# Patient Record
Sex: MALE | Race: WHITE | URBAN - METROPOLITAN AREA
[De-identification: names, ages, dates, MRNs, and addresses within clinical notes are randomized per-mention and may not be internally consistent; named-entity substitution may affect disease eponyms.]

---

## 2018-03-28 ENCOUNTER — EMERGENCY (EMERGENCY)
Facility: HOSPITAL | Age: 43
LOS: 1 days | Discharge: HOME | End: 2018-03-28
Admitting: EMERGENCY MEDICINE
Payer: COMMERCIAL

## 2018-03-28 VITALS
DIASTOLIC BLOOD PRESSURE: 70 MMHG | TEMPERATURE: 98 F | OXYGEN SATURATION: 97 % | SYSTOLIC BLOOD PRESSURE: 128 MMHG | RESPIRATION RATE: 20 BRPM | HEART RATE: 110 BPM

## 2018-03-28 DIAGNOSIS — R41.82 ALTERED MENTAL STATUS, UNSPECIFIED: ICD-10-CM

## 2018-03-28 DIAGNOSIS — F10.129 ALCOHOL ABUSE WITH INTOXICATION, UNSPECIFIED: ICD-10-CM

## 2018-03-28 PROCEDURE — 99284 EMERGENCY DEPT VISIT MOD MDM: CPT

## 2018-03-28 NOTE — ED PROVIDER NOTE - OBJECTIVE STATEMENT
41 y/o M with Hx of Seizure BIBA for alcohol intoxication 41 y/o M with Hx of Seizure BIBA for alcohol intoxication. Pt is arousable to tactile stimulus but cannot answer questions appropriately at this time. Remainder of Hx limited 2/2 ETOH intox.

## 2018-03-28 NOTE — ED PROVIDER NOTE - MEDICAL DECISION MAKING DETAILS
Pt A&Ox3. NAD. Calm and cooperative. Clear speech, steady gait with no complaints at this time. Requesting his dose of Keppra for Seizure D/O stating that he missed his dose today. Otherwise no sx's or complaints at this time. Will give dose of Keppra and D/C.

## 2018-03-28 NOTE — ED ADULT TRIAGE NOTE - CHIEF COMPLAINT QUOTE
BLAS, ambulatory, for altered mental status. Pt admits to drinking tonight. Also states hx of seizures, requesting a dose of his medication; keppra.

## 2018-03-29 RX ORDER — LEVETIRACETAM 250 MG/1
500 TABLET, FILM COATED ORAL ONCE
Refills: 0 | Status: COMPLETED | OUTPATIENT
Start: 2018-03-29 | End: 2018-03-29

## 2018-03-29 RX ADMIN — LEVETIRACETAM 500 MILLIGRAM(S): 250 TABLET, FILM COATED ORAL at 01:42

## 2018-05-16 ENCOUNTER — EMERGENCY (EMERGENCY)
Facility: HOSPITAL | Age: 43
LOS: 1 days | Discharge: HOME | End: 2018-05-16
Admitting: EMERGENCY MEDICINE
Payer: MEDICAID

## 2018-05-16 VITALS
HEART RATE: 62 BPM | TEMPERATURE: 96 F | DIASTOLIC BLOOD PRESSURE: 71 MMHG | RESPIRATION RATE: 16 BRPM | SYSTOLIC BLOOD PRESSURE: 111 MMHG | OXYGEN SATURATION: 98 %

## 2018-05-16 DIAGNOSIS — Z79.899 OTHER LONG TERM (CURRENT) DRUG THERAPY: ICD-10-CM

## 2018-05-16 DIAGNOSIS — F10.120 ALCOHOL ABUSE WITH INTOXICATION, UNCOMPLICATED: ICD-10-CM

## 2018-05-16 DIAGNOSIS — F10.129 ALCOHOL ABUSE WITH INTOXICATION, UNSPECIFIED: ICD-10-CM

## 2018-05-16 PROCEDURE — 99284 EMERGENCY DEPT VISIT MOD MDM: CPT

## 2018-05-16 NOTE — ED PROVIDER NOTE - PROGRESS NOTE DETAILS
patient ambulated to bathroom. talking on cell phone. requesting to leave The patient is now awake and alert, clinically sober.  Able to walk a straight line.  Repeat exam and neuro/cranial nerve exams normal.  No evidence of head/neck trauma.  Patient denies any pain or other complaints.  Denies cp/sob/ha/abd pain.  Abd soft, lungs clear, heart exam normal.  Dionisio po challenge.  Patient says only used alcohol no other substances.  Denies any assault.  Feels much better and pt feels safe for discharge.  No evidence of intoxication at this time or alcohol withdrawal.  No other complaints on discharge.

## 2018-05-16 NOTE — ED PROVIDER NOTE - OBJECTIVE STATEMENT
42 year old male brought in for alcohol intoxication. reports EMS found him sleeping on the ground.   Admits to heavy ETOH use today, no other complaints.  Placed in stretcher.  Unable to cooperate with remainder of history. translation provided by NEHEMIAH Chawla. denies any head trauma. patient reports did not fall. lives at [facility]

## 2018-05-17 ENCOUNTER — EMERGENCY (EMERGENCY)
Facility: HOSPITAL | Age: 43
LOS: 1 days | Discharge: HOME | End: 2018-05-17
Admitting: EMERGENCY MEDICINE
Payer: MEDICAID

## 2018-05-17 VITALS
SYSTOLIC BLOOD PRESSURE: 132 MMHG | RESPIRATION RATE: 16 BRPM | HEART RATE: 88 BPM | DIASTOLIC BLOOD PRESSURE: 78 MMHG | OXYGEN SATURATION: 97 %

## 2018-05-17 VITALS
OXYGEN SATURATION: 97 % | DIASTOLIC BLOOD PRESSURE: 22 MMHG | RESPIRATION RATE: 18 BRPM | TEMPERATURE: 98 F | SYSTOLIC BLOOD PRESSURE: 121 MMHG | HEART RATE: 96 BPM

## 2018-05-17 DIAGNOSIS — Z79.899 OTHER LONG TERM (CURRENT) DRUG THERAPY: ICD-10-CM

## 2018-05-17 DIAGNOSIS — F10.120 ALCOHOL ABUSE WITH INTOXICATION, UNCOMPLICATED: ICD-10-CM

## 2018-05-17 DIAGNOSIS — R41.82 ALTERED MENTAL STATUS, UNSPECIFIED: ICD-10-CM

## 2018-05-17 PROCEDURE — 99284 EMERGENCY DEPT VISIT MOD MDM: CPT | Mod: 25

## 2018-05-17 NOTE — ED PROVIDER NOTE - PROGRESS NOTE DETAILS
sleeping but arousable in stretcher The patient is now awake and alert, clinically sober.  Able to walk a straight line.  Repeat exam and neuro/cranial nerve exams normal.  No evidence of head/neck trauma.  Patient denies any pain or other complaints.  Denies cp/sob/ha/abd pain.  Abd soft, lungs clear, heart exam normal.  Dionisio po challenge.  Patient says only used alcohol no other substances.  Denies any assault.  Feels much better and pt feels safe for discharge.  No evidence of intoxication at this time or alcohol withdrawal.  No other complaints on discharge.

## 2018-05-17 NOTE — ED PROVIDER NOTE - OBJECTIVE STATEMENT
42 year old male brought in by EMS for alcohol intox. picked up at [address] patient was discharged from ED a few hours earlier. admits to drinking etoh. no sign of trauma. Placed in stretcher and quickly falls asleep.  Unable to cooperate with remainder of history.

## 2018-06-13 ENCOUNTER — EMERGENCY (EMERGENCY)
Facility: HOSPITAL | Age: 43
LOS: 1 days | Discharge: HOME | End: 2018-06-13
Attending: EMERGENCY MEDICINE | Admitting: EMERGENCY MEDICINE
Payer: COMMERCIAL

## 2018-06-13 VITALS
RESPIRATION RATE: 16 BRPM | SYSTOLIC BLOOD PRESSURE: 110 MMHG | OXYGEN SATURATION: 97 % | DIASTOLIC BLOOD PRESSURE: 66 MMHG | TEMPERATURE: 98 F | HEART RATE: 86 BPM

## 2018-06-13 VITALS
TEMPERATURE: 97 F | DIASTOLIC BLOOD PRESSURE: 62 MMHG | OXYGEN SATURATION: 97 % | HEART RATE: 73 BPM | SYSTOLIC BLOOD PRESSURE: 98 MMHG | RESPIRATION RATE: 18 BRPM
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
## 2018-06-13 DIAGNOSIS — F10.120 ALCOHOL ABUSE WITH INTOXICATION, UNCOMPLICATED: ICD-10-CM
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
## 2018-06-13 DIAGNOSIS — S00.81XA ABRASION OF OTHER PART OF HEAD, INITIAL ENCOUNTER: ICD-10-CM
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
## 2018-06-13 DIAGNOSIS — R41.82 ALTERED MENTAL STATUS, UNSPECIFIED: ICD-10-CM

## 2018-06-13 DIAGNOSIS — S09.90XA UNSPECIFIED INJURY OF HEAD, INITIAL ENCOUNTER: ICD-10-CM

## 2018-06-13 PROCEDURE — 99284 EMERGENCY DEPT VISIT MOD MDM: CPT

## 2018-06-13 NOTE — ED PROVIDER NOTE - MEDICAL DECISION MAKING DETAILS
Patient here with apparent isolated EtOH intoxication. Will observe until more awake, alert, steady and with a safe plan for d/c.

## 2018-06-13 NOTE — ED PROVIDER NOTE - PMH
Alcohol abuse Alcohol abuse    Nonintractable epilepsy without status epilepticus, unspecified epilepsy type

## 2018-06-13 NOTE — ED ADULT TRIAGE NOTE - CHIEF COMPLAINT QUOTE
Pt. picked up from [employer] [facility] for sleeping on the street, abrasion to forehead and hand. Pt. has alcohol on his person when picked up.

## 2018-06-13 NOTE — ED PROVIDER NOTE - OBJECTIVE STATEMENT
Patient was BIBE for public EtOH intoxication. Was found passed out on the street. Unsteady gait, drowsy. No pain, no n/v and + abrasion to head.  No other obvious trauma or incontinence.

## 2018-06-13 NOTE — ED PROVIDER NOTE - CARE PLAN
Principal Discharge DX:	Alcoholic intoxication without complication  Secondary Diagnosis:	Head injuries, initial encounter

## 2018-06-13 NOTE — ED PROVIDER NOTE - PHYSICAL EXAMINATION
Const: Severe EtOH intox, poor hygiene, unkempt  ENT: Airway patent, protecting airway. Nasal mucosa clear. MMM. No scalp hematoma and no apparent c-spine tenderness. + forehead abrasion r.  Eyes: Clear bilaterally, pupils equal, round 2mm  Cardiac: Normal rate, regular rhythm.  Heart sounds S1, S2.  No murmurs, rubs or gallops.  Resp: Breath sounds clear and equal bilaterally.  GI: Abdomen soft, appears non-tender, no guarding.  MSK: No signs of acute trauma or injury.   Neuro: Severe EtOH intox, QUILES, normal tone, non-verbal  Skin: No signs of acute trauma or injury.   Psych, Severe EtOH intox

## 2018-06-13 NOTE — ED PROVIDER NOTE - PROGRESS NOTE DETAILS
HISTORY:

SOB  



COMPARISON:

No prior. 



FINDINGS:



LUNGS:

Poor inspiration with low lung volumes, crowded bronchovascular 

markings and mild bibasilar atelectasis.



PLEURA:

No significant pleural effusion identified, no pneumothorax apparent.



CARDIOVASCULAR:

Heart size appears upper limits of normal in size. 



OSSEOUS STRUCTURES:

No significant abnormalities.



VISUALIZED UPPER ABDOMEN:

Normal.



OTHER FINDINGS:

None.



IMPRESSION:

Poor inspiration with low lung volumes, crowded bronchovascular 

markings and mild bibasilar atelectasis. Patient awake, wants to go. Refused CT.

## 2018-06-13 NOTE — ED ADULT NURSE NOTE - CHIEF COMPLAINT QUOTE
Pt. picked up from [facility] for sleeping on the street, abrasion to forehead and hand. Pt. has alcohol on his person when picked up.

## 2018-06-25 ENCOUNTER — EMERGENCY (EMERGENCY)
Facility: HOSPITAL | Age: 43
LOS: 1 days | Discharge: HOME | End: 2018-06-25
Attending: EMERGENCY MEDICINE | Admitting: EMERGENCY MEDICINE
Payer: COMMERCIAL

## 2018-06-25 VITALS
TEMPERATURE: 98 F | HEART RATE: 89 BPM | RESPIRATION RATE: 16 BRPM | OXYGEN SATURATION: 99 % | SYSTOLIC BLOOD PRESSURE: 112 MMHG | DIASTOLIC BLOOD PRESSURE: 71 MMHG

## 2018-06-25 DIAGNOSIS — R41.82 ALTERED MENTAL STATUS, UNSPECIFIED: ICD-10-CM

## 2018-06-25 DIAGNOSIS — F10.129 ALCOHOL ABUSE WITH INTOXICATION, UNSPECIFIED: ICD-10-CM

## 2018-06-25 DIAGNOSIS — Z79.899 OTHER LONG TERM (CURRENT) DRUG THERAPY: ICD-10-CM

## 2018-06-25 PROCEDURE — 99284 EMERGENCY DEPT VISIT MOD MDM: CPT

## 2018-06-25 NOTE — ED PROVIDER NOTE - OBJECTIVE STATEMENT
43 y/o M w/ EtOH abuse and seizures, BIBEMS for public EtOH intoxication. EMS states he was drinking Gatorade. Admits to ETOH use today, no other complaints.  Placed in stretcher and quickly falls asleep. No other obvious trauma or incontinence. 43 y/o M w/ hx EtOH abuse and seizures, BIBEMS for public EtOH intoxication. EMS states he was drinking Gatorade. Admits to ETOH use today, no other complaints.  Placed in stretcher and quickly falls asleep. No other obvious trauma or incontinence.

## 2018-06-25 NOTE — ED PROVIDER NOTE - PMH
Alcohol abuse    Alcohol abuse    Medical history unknown    Nonintractable epilepsy without status epilepticus, unspecified epilepsy type    Seizure

## 2018-06-25 NOTE — SBIRT NOTE. - NSSBIRTSERVICES_GEN_A_ED_FT
Provided SBIRT services: Full screen positive. Referral to Treatment attempted. Screening results were reviewed with the patient and patient was provided information about healthy guidelines and potential negative consequences associated with level of risk. Motivation and readiness to reduce or stop use was discussed and goals and activities to make changes were suggested/offered.  Options discussed for further evaluation and treatment, but referral to treatment was not completed because Patient refused    Audit Score: 20  DAST Score:0  Duration = 20 Minutes

## 2018-07-21 ENCOUNTER — INPATIENT (INPATIENT)
Facility: HOSPITAL | Age: 43
LOS: 6 days | Discharge: HOME | DRG: 896 | End: 2018-07-28
Attending: INTERNAL MEDICINE | Admitting: INTERNAL MEDICINE
Payer: COMMERCIAL

## 2018-07-21 VITALS
DIASTOLIC BLOOD PRESSURE: 84 MMHG | SYSTOLIC BLOOD PRESSURE: 127 MMHG | HEART RATE: 59 BPM | TEMPERATURE: 97 F | OXYGEN SATURATION: 99 % | RESPIRATION RATE: 18 BRPM

## 2018-07-21 PROCEDURE — 99285 EMERGENCY DEPT VISIT HI MDM: CPT | Mod: 25

## 2018-07-21 PROCEDURE — 70450 CT HEAD/BRAIN W/O DYE: CPT | Mod: 26

## 2018-07-21 PROCEDURE — 93010 ELECTROCARDIOGRAM REPORT: CPT

## 2018-07-21 NOTE — ED PROVIDER NOTE - PROGRESS NOTE DETAILS
Pt awake but appears to have gone into etoh withdrawal.  +Tremors and tongue fasciculations.  Will place on monitor.  No tachycardia and no HTN.  Will give benzos and place on observation.  Will check labs and given thiamine and folate.  Will start IV hydration.

## 2018-07-21 NOTE — ED ADULT NURSE NOTE - OBJECTIVE STATEMENT
41 yo M BIBA for AMS. Pt admits to ETOH use today. Pt has bump on right forehead with abrasion noted. pt states he has h/o epilepsy and is in need of keppra/

## 2018-07-21 NOTE — ED PROVIDER NOTE - OBJECTIVE STATEMENT
Pt is a 43yo M Pt BIB by EMS for ETOH intoxication.  Admits to heavy ETOH use today, no other complaints.  Placed in stretcher and quickly falls asleep.  Unable to cooperate with remainder of history.

## 2018-07-21 NOTE — ED PROVIDER NOTE - PHYSICAL EXAMINATION
General: lethargic, arousable to touch, smells of alcohol  Head: +Abrasion to R forehead - appears fresh.   Eyes: PERRL  Heart: RRR  Lungs: CTAB  Abd: soft, NTND  Neuro: moves all 4 extremities equally  Skin: no e/o lacerations or ecchymoses.  See Head exam.

## 2018-07-22 LAB
ALBUMIN SERPL ELPH-MCNC: 3.5 G/DL — SIGNIFICANT CHANGE UP (ref 3.4–5)
ALP SERPL-CCNC: 112 U/L — SIGNIFICANT CHANGE UP (ref 40–120)
ALT FLD-CCNC: 60 U/L — HIGH (ref 12–42)
ANION GAP SERPL CALC-SCNC: 11 MMOL/L — SIGNIFICANT CHANGE UP (ref 9–16)
APTT BLD: 31.2 SEC — SIGNIFICANT CHANGE UP (ref 27.5–36.5)
AST SERPL-CCNC: 116 U/L — HIGH (ref 15–37)
BASOPHILS NFR BLD AUTO: 0.9 % — SIGNIFICANT CHANGE UP (ref 0–2)
BILIRUB SERPL-MCNC: 0.4 MG/DL — SIGNIFICANT CHANGE UP (ref 0.2–1.2)
BUN SERPL-MCNC: 5 MG/DL — LOW (ref 7–23)
CALCIUM SERPL-MCNC: 8.7 MG/DL — SIGNIFICANT CHANGE UP (ref 8.5–10.5)
CHLORIDE SERPL-SCNC: 106 MMOL/L — SIGNIFICANT CHANGE UP (ref 96–108)
CO2 SERPL-SCNC: 27 MMOL/L — SIGNIFICANT CHANGE UP (ref 22–31)
CREAT SERPL-MCNC: 0.55 MG/DL — SIGNIFICANT CHANGE UP (ref 0.5–1.3)
EOSINOPHIL NFR BLD AUTO: 1.6 % — SIGNIFICANT CHANGE UP (ref 0–6)
ETHANOL SERPL-MCNC: 299 MG/DL — HIGH
GLUCOSE SERPL-MCNC: 87 MG/DL — SIGNIFICANT CHANGE UP (ref 70–99)
HCT VFR BLD CALC: 42.4 % — SIGNIFICANT CHANGE UP (ref 39–50)
HGB BLD-MCNC: 14.4 G/DL — SIGNIFICANT CHANGE UP (ref 13–17)
IMM GRANULOCYTES NFR BLD AUTO: 0.2 % — SIGNIFICANT CHANGE UP (ref 0–1.5)
INR BLD: 0.96 — SIGNIFICANT CHANGE UP (ref 0.88–1.16)
LYMPHOCYTES # BLD AUTO: 34.2 % — SIGNIFICANT CHANGE UP (ref 13–44)
MAGNESIUM SERPL-MCNC: 1.9 MG/DL — SIGNIFICANT CHANGE UP (ref 1.6–2.6)
MCHC RBC-ENTMCNC: 33.6 PG — SIGNIFICANT CHANGE UP (ref 27–34)
MCHC RBC-ENTMCNC: 34 G/DL — SIGNIFICANT CHANGE UP (ref 32–36)
MCV RBC AUTO: 98.8 FL — SIGNIFICANT CHANGE UP (ref 80–100)
MONOCYTES NFR BLD AUTO: 9.6 % — SIGNIFICANT CHANGE UP (ref 2–14)
NEUTROPHILS NFR BLD AUTO: 53.5 % — SIGNIFICANT CHANGE UP (ref 43–77)
PLATELET # BLD AUTO: 173 K/UL — SIGNIFICANT CHANGE UP (ref 150–400)
POTASSIUM SERPL-MCNC: 3.4 MMOL/L — LOW (ref 3.5–5.3)
POTASSIUM SERPL-SCNC: 3.4 MMOL/L — LOW (ref 3.5–5.3)
PROT SERPL-MCNC: 7.7 G/DL — SIGNIFICANT CHANGE UP (ref 6.4–8.2)
PROTHROM AB SERPL-ACNC: 10.6 SEC — SIGNIFICANT CHANGE UP (ref 9.8–12.7)
RBC # BLD: 4.29 M/UL — SIGNIFICANT CHANGE UP (ref 4.2–5.8)
RBC # FLD: 14.4 % — SIGNIFICANT CHANGE UP (ref 10.3–16.9)
SODIUM SERPL-SCNC: 144 MMOL/L — SIGNIFICANT CHANGE UP (ref 132–145)
WBC # BLD: 5.6 K/UL — SIGNIFICANT CHANGE UP (ref 3.8–10.5)
WBC # FLD AUTO: 5.6 K/UL — SIGNIFICANT CHANGE UP (ref 3.8–10.5)

## 2018-07-22 PROCEDURE — 71045 X-RAY EXAM CHEST 1 VIEW: CPT | Mod: 26

## 2018-07-22 PROCEDURE — 99236 HOSP IP/OBS SAME DATE HI 85: CPT | Mod: 25

## 2018-07-22 PROCEDURE — 99291 CRITICAL CARE FIRST HOUR: CPT

## 2018-07-22 RX ORDER — SODIUM CHLORIDE 9 MG/ML
1000 INJECTION INTRAMUSCULAR; INTRAVENOUS; SUBCUTANEOUS ONCE
Refills: 0 | Status: COMPLETED | OUTPATIENT
Start: 2018-07-22 | End: 2018-07-22

## 2018-07-22 RX ORDER — THIAMINE MONONITRATE (VIT B1) 100 MG
100 TABLET ORAL ONCE
Refills: 0 | Status: COMPLETED | OUTPATIENT
Start: 2018-07-22 | End: 2018-07-22

## 2018-07-22 RX ORDER — FOLIC ACID 0.8 MG
1 TABLET ORAL ONCE
Refills: 0 | Status: COMPLETED | OUTPATIENT
Start: 2018-07-22 | End: 2018-07-22

## 2018-07-22 RX ORDER — SODIUM CHLORIDE 9 MG/ML
1000 INJECTION, SOLUTION INTRAVENOUS
Refills: 0 | Status: DISCONTINUED | OUTPATIENT
Start: 2018-07-22 | End: 2018-07-24

## 2018-07-22 RX ORDER — SODIUM CHLORIDE 9 MG/ML
1000 INJECTION, SOLUTION INTRAVENOUS
Refills: 0 | Status: DISCONTINUED | OUTPATIENT
Start: 2018-07-22 | End: 2018-07-23

## 2018-07-22 RX ORDER — LEVETIRACETAM 250 MG/1
1000 TABLET, FILM COATED ORAL ONCE
Refills: 0 | Status: COMPLETED | OUTPATIENT
Start: 2018-07-22 | End: 2018-07-22

## 2018-07-22 RX ADMIN — LEVETIRACETAM 1000 MILLIGRAM(S): 250 TABLET, FILM COATED ORAL at 17:52

## 2018-07-22 RX ADMIN — Medication 1 MILLIGRAM(S): at 17:13

## 2018-07-22 RX ADMIN — Medication 100 MILLIGRAM(S): at 07:01

## 2018-07-22 RX ADMIN — SODIUM CHLORIDE 1000 MILLILITER(S): 9 INJECTION INTRAMUSCULAR; INTRAVENOUS; SUBCUTANEOUS at 07:30

## 2018-07-22 RX ADMIN — Medication 50 MILLIGRAM(S): at 20:55

## 2018-07-22 RX ADMIN — LEVETIRACETAM 1000 MILLIGRAM(S): 250 TABLET, FILM COATED ORAL at 13:24

## 2018-07-22 RX ADMIN — SODIUM CHLORIDE 1000 MILLILITER(S): 9 INJECTION INTRAMUSCULAR; INTRAVENOUS; SUBCUTANEOUS at 14:40

## 2018-07-22 RX ADMIN — Medication 50 MILLIGRAM(S): at 13:24

## 2018-07-22 RX ADMIN — SODIUM CHLORIDE 150 MILLILITER(S): 9 INJECTION, SOLUTION INTRAVENOUS at 17:52

## 2018-07-22 RX ADMIN — SODIUM CHLORIDE 150 MILLILITER(S): 9 INJECTION, SOLUTION INTRAVENOUS at 21:09

## 2018-07-22 RX ADMIN — Medication 1 MILLIGRAM(S): at 07:01

## 2018-07-22 RX ADMIN — Medication 1 MILLIGRAM(S): at 13:27

## 2018-07-22 RX ADMIN — Medication 1 MILLIGRAM(S): at 17:52

## 2018-07-22 RX ADMIN — Medication 2 MILLIGRAM(S): at 06:30

## 2018-07-22 RX ADMIN — SODIUM CHLORIDE 1000 MILLILITER(S): 9 INJECTION INTRAMUSCULAR; INTRAVENOUS; SUBCUTANEOUS at 06:30

## 2018-07-22 RX ADMIN — Medication 50 MILLIGRAM(S): at 17:13

## 2018-07-22 RX ADMIN — Medication 2 MILLIGRAM(S): at 20:55

## 2018-07-22 RX ADMIN — Medication 50 MILLIGRAM(S): at 17:52

## 2018-07-22 RX ADMIN — SODIUM CHLORIDE 2000 MILLILITER(S): 9 INJECTION INTRAMUSCULAR; INTRAVENOUS; SUBCUTANEOUS at 13:40

## 2018-07-22 RX ADMIN — Medication 100 MILLIGRAM(S): at 06:42

## 2018-07-22 NOTE — ED CDU PROVIDER INITIAL DAY NOTE - OBJECTIVE STATEMENT
Pt is a 41yo M Pt BIB by EMS for ETOH intoxication.  Admits to heavy ETOH use today, no other complaints.  Placed in stretcher and quickly falls asleep.  Unable to cooperate with remainder of history.

## 2018-07-22 NOTE — ED CDU PROVIDER INITIAL DAY NOTE - PROGRESS NOTE DETAILS
CIWA is currently 10. Reexamined pt, pt currently sedated, no distress, responds to verbal stimulus, no signs of tremors at this time. Reevaluated multiple times during the day, initially very sedated, then more verbal and alert/oriented, speaks Sudanese and needed  to communicate -  number [id number]. Pt provided name and  for registration. States he has hx of BA, seizure disorder (on keppra tid?). States he has been in detox in the past and is agreeable on getting tx here and to be seen by Alcohol abuse (SBIRT) specialist tomorrow for detox recommendations. Will continue to provide maintenance fluids (thiamine and FA already given), will order food, pt responding well to benzos (IV/po). CIWA around 10, stable Reevaluated multiple times during the day, initially very sedated, then more verbal and alert/oriented, speaks Guinean and needed  to communicate -  number [id number]. Pt provided name and  for registration. States he has hx of BA, seizure disorder (on keppra tid?). States he has been in detox in the past and is agreeable on getting tx here and to be seen by Alcohol abuse (SBIRT) specialist tomorrow for detox recommendations. Will continue to provide maintenance fluids (thiamine and FA already given), will order food, pt responding well to benzos (IV/po). CIWA fluctuatin between 8-11, stable Ordered standing dose of librium 1 4hrs, pt ate dinner. To sign out for morning eval by Alcohol abuse specialist.

## 2018-07-22 NOTE — ED ADULT NURSE REASSESSMENT NOTE - NS ED NURSE REASSESS COMMENT FT1
Pt noted to be in ETOH withdrawl and having tremors at this time. Pt moved to Bed 4, Iv placed, fluids and medications given as ordered.  Pt placed on cont telemetry and pulse ox monitoring at this time. CIWA score in chart at this time. WIll continue to monitor.
notified dr. stuart that pt's ranulfo at hr 40's. pt upgraded to tele.
pt resting in bed. no signs of acute distress noted. no tremors noted.
received pt from day shift RN. pt aox3 but tremulous and flushed in the face. pt placed on monitor. VSS. assisted pt to commode. dr. stuart at bedside.
Pt appears in no distress on monitor. sleeping comfortably.

## 2018-07-22 NOTE — ED CDU PROVIDER DISPOSITION NOTE - CLINICAL COURSE
Observed in the ED after receiving additional IV ativan and PO librium.  Sleeping more comfortably.  When awakened became tremulous and fidgety.  CIWA improved to ~ 6.  Spoke with Dr. Rios again and with Dr. Feliciano.  Admit to [facility] Observed in the ED after receiving additional IV ativan and PO librium.  Sleeping more comfortably.  When awakened became tremulous and fidgety.  CIWA improved to ~ 6.  Spoke with Dr. Rios again and with Dr. Feliciano.  Admit to [facility]    1150 pm - patient found to be bradycardiac on monitor 46-52.  BP stable - 150s/80s.  Patient sleepy and arouses to light tactile stimuli without change in HR.  EKG sinus bradycardia.  HR in the 70-80s during stay.  Likely from keppra + ativan/librium.  Discussed with Jenny Preston and will now upgrade to monitored bed on [address] under Dr. Damon

## 2018-07-22 NOTE — ED CDU PROVIDER SUBSEQUENT DAY NOTE - MEDICAL DECISION MAKING DETAILS
Received patient from Dr. Mina.  Patent remains tremulous and fidgety.  Also seems slightly confused.  CIWA remains in the 10 range.  Repeat vitals with HR and BP wnl.  Discussed with RN and agree with admission to monitored bed for alcohol withdrawal.  Additional IV and PO benzodiazepines.  Reviewed with Dr. Rios from [facility].  Will reassess after additional benzo for control of symptoms

## 2018-07-23 DIAGNOSIS — R63.8 OTHER SYMPTOMS AND SIGNS CONCERNING FOOD AND FLUID INTAKE: ICD-10-CM

## 2018-07-23 DIAGNOSIS — F10.230 ALCOHOL DEPENDENCE WITH WITHDRAWAL, UNCOMPLICATED: ICD-10-CM

## 2018-07-23 DIAGNOSIS — F17.200 NICOTINE DEPENDENCE, UNSPECIFIED, UNCOMPLICATED: ICD-10-CM

## 2018-07-23 DIAGNOSIS — G40.909 EPILEPSY, UNSPECIFIED, NOT INTRACTABLE, WITHOUT STATUS EPILEPTICUS: ICD-10-CM

## 2018-07-23 DIAGNOSIS — R00.1 BRADYCARDIA, UNSPECIFIED: ICD-10-CM

## 2018-07-23 DIAGNOSIS — J45.909 UNSPECIFIED ASTHMA, UNCOMPLICATED: ICD-10-CM

## 2018-07-23 LAB
ALBUMIN SERPL ELPH-MCNC: 3.2 G/DL — LOW (ref 3.3–5)
ALBUMIN SERPL ELPH-MCNC: 3.3 G/DL — SIGNIFICANT CHANGE UP (ref 3.3–5)
ALP SERPL-CCNC: 94 U/L — SIGNIFICANT CHANGE UP (ref 40–120)
ALP SERPL-CCNC: 97 U/L — SIGNIFICANT CHANGE UP (ref 40–120)
ALT FLD-CCNC: 37 U/L — SIGNIFICANT CHANGE UP (ref 10–45)
ALT FLD-CCNC: 38 U/L — SIGNIFICANT CHANGE UP (ref 10–45)
ANION GAP SERPL CALC-SCNC: 13 MMOL/L — SIGNIFICANT CHANGE UP (ref 5–17)
ANION GAP SERPL CALC-SCNC: 16 MMOL/L — SIGNIFICANT CHANGE UP (ref 5–17)
AST SERPL-CCNC: 84 U/L — HIGH (ref 10–40)
AST SERPL-CCNC: 95 U/L — HIGH (ref 10–40)
BILIRUB SERPL-MCNC: 0.9 MG/DL — SIGNIFICANT CHANGE UP (ref 0.2–1.2)
BILIRUB SERPL-MCNC: 1 MG/DL — SIGNIFICANT CHANGE UP (ref 0.2–1.2)
BUN SERPL-MCNC: 6 MG/DL — LOW (ref 7–23)
BUN SERPL-MCNC: 6 MG/DL — LOW (ref 7–23)
CALCIUM SERPL-MCNC: 8.1 MG/DL — LOW (ref 8.4–10.5)
CALCIUM SERPL-MCNC: 8.7 MG/DL — SIGNIFICANT CHANGE UP (ref 8.4–10.5)
CHLORIDE SERPL-SCNC: 100 MMOL/L — SIGNIFICANT CHANGE UP (ref 96–108)
CHLORIDE SERPL-SCNC: 97 MMOL/L — SIGNIFICANT CHANGE UP (ref 96–108)
CO2 SERPL-SCNC: 24 MMOL/L — SIGNIFICANT CHANGE UP (ref 22–31)
CO2 SERPL-SCNC: 24 MMOL/L — SIGNIFICANT CHANGE UP (ref 22–31)
CREAT SERPL-MCNC: 0.54 MG/DL — SIGNIFICANT CHANGE UP (ref 0.5–1.3)
CREAT SERPL-MCNC: 0.55 MG/DL — SIGNIFICANT CHANGE UP (ref 0.5–1.3)
GLUCOSE SERPL-MCNC: 112 MG/DL — HIGH (ref 70–99)
GLUCOSE SERPL-MCNC: 132 MG/DL — HIGH (ref 70–99)
HCT VFR BLD CALC: 37.5 % — LOW (ref 39–50)
HCT VFR BLD CALC: 39.6 % — SIGNIFICANT CHANGE UP (ref 39–50)
HGB BLD-MCNC: 12.6 G/DL — LOW (ref 13–17)
HGB BLD-MCNC: 12.7 G/DL — LOW (ref 13–17)
MAGNESIUM SERPL-MCNC: 1.3 MG/DL — LOW (ref 1.6–2.6)
MAGNESIUM SERPL-MCNC: 2.2 MG/DL — SIGNIFICANT CHANGE UP (ref 1.6–2.6)
MCHC RBC-ENTMCNC: 32.1 G/DL — SIGNIFICANT CHANGE UP (ref 32–36)
MCHC RBC-ENTMCNC: 32.7 PG — SIGNIFICANT CHANGE UP (ref 27–34)
MCHC RBC-ENTMCNC: 33.3 PG — SIGNIFICANT CHANGE UP (ref 27–34)
MCHC RBC-ENTMCNC: 33.6 G/DL — SIGNIFICANT CHANGE UP (ref 32–36)
MCV RBC AUTO: 102.1 FL — HIGH (ref 80–100)
MCV RBC AUTO: 99.2 FL — SIGNIFICANT CHANGE UP (ref 80–100)
PHOSPHATE SERPL-MCNC: 2.4 MG/DL — LOW (ref 2.5–4.5)
PHOSPHATE SERPL-MCNC: 2.5 MG/DL — SIGNIFICANT CHANGE UP (ref 2.5–4.5)
PLATELET # BLD AUTO: 140 K/UL — LOW (ref 150–400)
PLATELET # BLD AUTO: 140 K/UL — LOW (ref 150–400)
POTASSIUM SERPL-MCNC: 3.3 MMOL/L — LOW (ref 3.5–5.3)
POTASSIUM SERPL-MCNC: 3.5 MMOL/L — SIGNIFICANT CHANGE UP (ref 3.5–5.3)
POTASSIUM SERPL-SCNC: 3.3 MMOL/L — LOW (ref 3.5–5.3)
POTASSIUM SERPL-SCNC: 3.5 MMOL/L — SIGNIFICANT CHANGE UP (ref 3.5–5.3)
PROT SERPL-MCNC: 6 G/DL — SIGNIFICANT CHANGE UP (ref 6–8.3)
PROT SERPL-MCNC: 6 G/DL — SIGNIFICANT CHANGE UP (ref 6–8.3)
RBC # BLD: 3.78 M/UL — LOW (ref 4.2–5.8)
RBC # BLD: 3.88 M/UL — LOW (ref 4.2–5.8)
RBC # FLD: 13.9 % — SIGNIFICANT CHANGE UP (ref 10.3–16.9)
RBC # FLD: 14.2 % — SIGNIFICANT CHANGE UP (ref 10.3–16.9)
SODIUM SERPL-SCNC: 137 MMOL/L — SIGNIFICANT CHANGE UP (ref 135–145)
SODIUM SERPL-SCNC: 137 MMOL/L — SIGNIFICANT CHANGE UP (ref 135–145)
WBC # BLD: 5.2 K/UL — SIGNIFICANT CHANGE UP (ref 3.8–10.5)
WBC # BLD: 5.5 K/UL — SIGNIFICANT CHANGE UP (ref 3.8–10.5)
WBC # FLD AUTO: 5.2 K/UL — SIGNIFICANT CHANGE UP (ref 3.8–10.5)
WBC # FLD AUTO: 5.5 K/UL — SIGNIFICANT CHANGE UP (ref 3.8–10.5)

## 2018-07-23 PROCEDURE — 99223 1ST HOSP IP/OBS HIGH 75: CPT | Mod: GC

## 2018-07-23 PROCEDURE — 99233 SBSQ HOSP IP/OBS HIGH 50: CPT | Mod: GC

## 2018-07-23 RX ORDER — LEVETIRACETAM 250 MG/1
750 TABLET, FILM COATED ORAL
Refills: 0 | Status: DISCONTINUED | OUTPATIENT
Start: 2018-07-23 | End: 2018-07-23

## 2018-07-23 RX ORDER — GABAPENTIN 400 MG/1
800 CAPSULE ORAL DAILY
Refills: 0 | Status: DISCONTINUED | OUTPATIENT
Start: 2018-07-23 | End: 2018-07-23

## 2018-07-23 RX ORDER — MAGNESIUM SULFATE 500 MG/ML
4 VIAL (ML) INJECTION ONCE
Refills: 0 | Status: DISCONTINUED | OUTPATIENT
Start: 2018-07-23 | End: 2018-07-23

## 2018-07-23 RX ORDER — MAGNESIUM SULFATE 500 MG/ML
2 VIAL (ML) INJECTION ONCE
Refills: 0 | Status: COMPLETED | OUTPATIENT
Start: 2018-07-23 | End: 2018-07-23

## 2018-07-23 RX ORDER — POTASSIUM CHLORIDE 20 MEQ
40 PACKET (EA) ORAL ONCE
Refills: 0 | Status: DISCONTINUED | OUTPATIENT
Start: 2018-07-23 | End: 2018-07-23

## 2018-07-23 RX ORDER — LEVETIRACETAM 250 MG/1
500 TABLET, FILM COATED ORAL
Refills: 0 | Status: DISCONTINUED | OUTPATIENT
Start: 2018-07-23 | End: 2018-07-28

## 2018-07-23 RX ORDER — NICOTINE POLACRILEX 2 MG
1 GUM BUCCAL DAILY
Refills: 0 | Status: DISCONTINUED | OUTPATIENT
Start: 2018-07-23 | End: 2018-07-28

## 2018-07-23 RX ORDER — POTASSIUM PHOSPHATE, MONOBASIC POTASSIUM PHOSPHATE, DIBASIC 236; 224 MG/ML; MG/ML
15 INJECTION, SOLUTION INTRAVENOUS ONCE
Refills: 0 | Status: COMPLETED | OUTPATIENT
Start: 2018-07-23 | End: 2018-07-23

## 2018-07-23 RX ORDER — POTASSIUM CHLORIDE 20 MEQ
40 PACKET (EA) ORAL EVERY 4 HOURS
Refills: 0 | Status: COMPLETED | OUTPATIENT
Start: 2018-07-23 | End: 2018-07-23

## 2018-07-23 RX ADMIN — Medication 75 MILLIGRAM(S): at 13:40

## 2018-07-23 RX ADMIN — Medication 40 MILLIEQUIVALENT(S): at 02:50

## 2018-07-23 RX ADMIN — Medication 75 MILLIGRAM(S): at 22:51

## 2018-07-23 RX ADMIN — Medication 75 MILLIGRAM(S): at 02:48

## 2018-07-23 RX ADMIN — Medication 50 GRAM(S): at 02:50

## 2018-07-23 RX ADMIN — Medication 2 MILLIGRAM(S): at 17:36

## 2018-07-23 RX ADMIN — POTASSIUM PHOSPHATE, MONOBASIC POTASSIUM PHOSPHATE, DIBASIC 62.5 MILLIMOLE(S): 236; 224 INJECTION, SOLUTION INTRAVENOUS at 10:14

## 2018-07-23 RX ADMIN — SODIUM CHLORIDE 150 MILLILITER(S): 9 INJECTION, SOLUTION INTRAVENOUS at 05:32

## 2018-07-23 RX ADMIN — Medication 40 MILLIEQUIVALENT(S): at 06:31

## 2018-07-23 RX ADMIN — LEVETIRACETAM 750 MILLIGRAM(S): 250 TABLET, FILM COATED ORAL at 17:36

## 2018-07-23 NOTE — PROGRESS NOTE ADULT - SUBJECTIVE AND OBJECTIVE BOX
BRETT VAZQUEZ 42y Male      TRANSFER NOTE:  TO :  Hospital Course:  43 yo M with a PMHx of epilepsy (on Keppra), asthma, and alcohol abuse (600-700 mL vodka/day, past intubation in last 3-4 years) who was brought in by EMS to [facility] for alcohol intoxication and was transferred to [address] for alcohol withdrawal and bradycardia in the 40s. Last drink was 7/21 evening. 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 Before coming to [facility], he had received Librium 300mg PO, Ativan 9mg, Keppra 1000mg. Currently being treated with Librium 75mg PO, Ativan 2mg IV q 2hr prn, and Keppra 750 BID. Patient is now stable for stepdown to [facility].    Interval HPI:    No acute events. Patient is being transferred from  to . Was evaluated for alcohol withdrawal, CIWA assessment at bedside was 14. Patient endorses nausea though has no episodes of emesis today. Patient was agitated and anxious he denied any tactile, auditory or visual hallucinations. Denied any headaches, confusion, fever, SOB, CP, diarrhea.        Patient seen and examined at bedside:    T(C): 37.2 (07-23-18 @ 14:31), Max: 37.2 (07-23-18 @ 14:31)  HR: 64 (07-23-18 @ 17:14) (46 - 79)  BP: 116/64 (07-23-18 @ 17:14) (105/59 - 154/80)  RR: 18 (07-23-18 @ 17:14) (16 - 19)  SpO2: 98% (07-23-18 @ 17:14) (97% - 98%)    Review of systems negative except as mentioned above    chlordiazePOXIDE 75 milliGRAM(s) Oral every 8 hours  levETIRAcetam 750 milliGRAM(s) Oral two times a day  LORazepam     Tablet 2 milliGRAM(s) Oral once  LORazepam   Injectable 2 milliGRAM(s) IV Push every 2 hours PRN  multivitamin/thiamine/folic acid in sodium chloride 0.9% 1000 milliLiter(s) IV Continuous <Continuous>      Physical Exam:    GENERAL: Anxious, mildly agitated lying in bed and tremulous  HEENT: NC/AT, MMM, PERRLA  NECK: No JVD, no LAD  RESPIRATORY: CTAB  CV: S1S2 present, RRR, no m/r/g  GI: Soft, NT/ND, normal active bowel sounds  EXT:  NEURO:  MSK:      Labs:                        12.7   5.2   )-----------( 140      ( 23 Jul 2018 06:18 )             39.6     07-23    137  |  100  |  6<L>  ----------------------------<  132<H>  3.5   |  24  |  0.54    Ca    8.7      23 Jul 2018 06:18  Phos  2.4     07-23  Mg     2.2     07-23    TPro  6.0  /  Alb  3.3  /  TBili  0.9  /  DBili  x   /  AST  84<H>  /  ALT  37  /  AlkPhos  97  07-23    PT/INR - ( 22 Jul 2018 06:16 )   PT: 10.6 sec;   INR: 0.96          PTT - ( 22 Jul 2018 06:16 )  PTT:31.2 sec      CAPILLARY BLOOD GLUCOSE      POCT Blood Glucose.: 155 mg/dL (22 Jul 2018 21:30)            Imaging: BRETT VAZQUEZ 42y Male      TRANSFER NOTE:  TO :  Hospital Course:  41 yo M with a PMHx of epilepsy (on Keppra), asthma, and alcohol abuse (600-700 mL vodka/day, past intubation in last 3-4 years) who was brought in by EMS to [facility] for alcohol intoxication and was transferred to [address] for alcohol withdrawal and bradycardia in the 40s. Last drink was 7/21 evening. 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 Before coming to [facility], he had received Librium 300mg PO, Ativan 9mg, Keppra 1000mg. Currently being treated with Librium 75mg PO, Ativan 2mg IV q 2hr prn, and Keppra 750 BID. Patient is now stable for stepdown to [facility].    Interval HPI:    No acute events. Patient is being transferred from  to . Was evaluated for alcohol withdrawal, CIWA assessment at bedside was 14. Patient endorses nausea though has no episodes of emesis today. Patient was agitated and anxious he denied any tactile, auditory or visual hallucinations. Denied any headaches, confusion, fever, SOB, CP, diarrhea.        Patient seen and examined at bedside:    T(C): 37.2 (07-23-18 @ 14:31), Max: 37.2 (07-23-18 @ 14:31)  HR: 64 (07-23-18 @ 17:14) (46 - 79)  BP: 116/64 (07-23-18 @ 17:14) (105/59 - 154/80)  RR: 18 (07-23-18 @ 17:14) (16 - 19)  SpO2: 98% (07-23-18 @ 17:14) (97% - 98%)    Review of systems negative except as mentioned above    chlordiazePOXIDE 75 milliGRAM(s) Oral every 8 hours  levETIRAcetam 750 milliGRAM(s) Oral two times a day  LORazepam     Tablet 2 milliGRAM(s) Oral once  LORazepam   Injectable 2 milliGRAM(s) IV Push every 2 hours PRN  multivitamin/thiamine/folic acid in sodium chloride 0.9% 1000 milliLiter(s) IV Continuous <Continuous>      Physical Exam:    GENERAL: Anxious, mildly agitated lying in bed and tremulous  HEENT: NC/AT, MMM, PERRLA  NECK: No JVD, no LAD  RESPIRATORY: CTAB, non labored breathin  CV: S1S2 present, RRR, no m/r/g  GI: Soft, NT/ND, normal active bowel sounds  EXT: Tremor at rest and extension, 2+ pulses in upper and lower ext B/L, no edema, no cyanosis  NEURO: AOx3,       Labs:                        12.7   5.2   )-----------( 140      ( 23 Jul 2018 06:18 )             39.6     07-23    137  |  100  |  6<L>  ----------------------------<  132<H>  3.5   |  24  |  0.54    Ca    8.7      23 Jul 2018 06:18  Phos  2.4     07-23  Mg     2.2     07-23    TPro  6.0  /  Alb  3.3  /  TBili  0.9  /  DBili  x   /  AST  84<H>  /  ALT  37  /  AlkPhos  97  07-23    PT/INR - ( 22 Jul 2018 06:16 )   PT: 10.6 sec;   INR: 0.96          PTT - ( 22 Jul 2018 06:16 )  PTT:31.2 sec      CAPILLARY BLOOD GLUCOSE      POCT Blood Glucose.: 155 mg/dL (22 Jul 2018 21:30)            Imaging: BRETT VAZQUEZ 42y Male      TRANSFER NOTE:  TO :  Hospital Course:  41 yo M with a PMHx of epilepsy (on Keppra), asthma, and alcohol abuse (600-700 mL vodka/day, past intubation in last 3-4 years) who was brought in by EMS to [facility] for alcohol intoxication and was transferred to [address] for alcohol withdrawal and bradycardia in the 40s. Last drink was 7/21 evening. 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 Before coming to [facility], he had received Librium 300mg PO, Ativan 9mg, Keppra 1000mg. Currently being treated with Librium 75mg PO, Ativan 2mg IV q 2hr prn, and Keppra 750 BID. Patient is now stable for stepdown to [email or web address]    Interval HPI:    No acute events. Patient is being transferred from  to . Was evaluated for alcohol withdrawal, CIWA assessment at bedside was 14. Patient endorses nausea though has no episodes of emesis today. Patient was agitated and anxious he denied any tactile, auditory or visual hallucinations. Denied any headaches, confusion, fever, SOB, CP, diarrhea.        Patient seen and examined at bedside:    T(C): 37.2 (07-23-18 @ 14:31), Max: 37.2 (07-23-18 @ 14:31)  HR: 64 (07-23-18 @ 17:14) (46 - 79)  BP: 116/64 (07-23-18 @ 17:14) (105/59 - 154/80)  RR: 18 (07-23-18 @ 17:14) (16 - 19)  SpO2: 98% (07-23-18 @ 17:14) (97% - 98%)    Review of systems negative except as mentioned above    chlordiazePOXIDE 75 milliGRAM(s) Oral every 8 hours  levETIRAcetam 750 milliGRAM(s) Oral two times a day  LORazepam     Tablet 2 milliGRAM(s) Oral once  LORazepam   Injectable 2 milliGRAM(s) IV Push every 2 hours PRN  multivitamin/thiamine/folic acid in sodium chloride 0.9% 1000 milliLiter(s) IV Continuous <Continuous>      Physical Exam:    GENERAL: Anxious, mildly agitated lying in bed and tremulous  HEENT: NC/AT, MMM, PERRLA  NECK: No JVD, no LAD  RESPIRATORY: CTAB, non labored breathing  CV: S1S2 present, RRR, no m/r/g  GI: Soft, NT/ND, normal active bowel sounds  EXT: Tremor at rest and extension, 2+ pulses in upper and lower ext B/L, no edema, no cyanosis  NEURO: AOx3,       Labs:                        12.7   5.2   )-----------( 140      ( 23 Jul 2018 06:18 )             39.6     07-23    137  |  100  |  6<L>  ----------------------------<  132<H>  3.5   |  24  |  0.54    Ca    8.7      23 Jul 2018 06:18  Phos  2.4     07-23  Mg     2.2     07-23    TPro  6.0  /  Alb  3.3  /  TBili  0.9  /  DBili  x   /  AST  84<H>  /  ALT  37  /  AlkPhos  97  07-23    PT/INR - ( 22 Jul 2018 06:16 )   PT: 10.6 sec;   INR: 0.96          PTT - ( 22 Jul 2018 06:16 )  PTT:31.2 sec      CAPILLARY BLOOD GLUCOSE      POCT Blood Glucose.: 155 mg/dL (22 Jul 2018 21:30)            Imaging:

## 2018-07-23 NOTE — PROGRESS NOTE ADULT - SUBJECTIVE AND OBJECTIVE BOX
INTERVAL HPI/OVERNIGHT EVENTS:    SUBJECTIVE: Patient seen and examined at bedside.    OBJECTIVE:    VITAL SIGNS:  ICU Vital Signs Last 24 Hrs  T(C): 36.9 (23 Jul 2018 05:00), Max: 37.1 (22 Jul 2018 17:18)  T(F): 98.5 (23 Jul 2018 05:00), Max: 98.7 (22 Jul 2018 17:18)  HR: 55 (23 Jul 2018 04:07) (46 - 88)  BP: 127/77 (23 Jul 2018 04:07) (110/68 - 154/80)  BP(mean): 97 (23 Jul 2018 00:56) (97 - 97)  ABP: --  ABP(mean): --  RR: 16 (23 Jul 2018 04:07) (16 - 18)  SpO2: 98% (23 Jul 2018 04:07) (96% - 99%)        07-22 @ 07:01  -  07-23 @ 05:57  --------------------------------------------------------  IN: 0 mL / OUT: 500 mL / NET: -500 mL      CAPILLARY BLOOD GLUCOSE      POCT Blood Glucose.: 155 mg/dL (22 Jul 2018 21:30)      PHYSICAL EXAM:    Constitutional: resting comfortably in bed, NAD  HEENT: NC/AT; PERRL, anicteric sclera; no oropharyngeal erythema or exudates; MMM  Neck: supple, no JVD  Respiratory: CTA B/L, no W/R/R; respirations appear non-labored, speaking full sentences  Cardiovascular: +S1/S2, RRR  Gastrointestinal: abdomen soft, NT/ND; +BS x4  Extremities: WWP; no clubbing, cyanosis or edema  Vascular: 2+ radial, DP/PT and femoral pulses B/L  Dermatologic: skin normal color and turgor; no visible rashes  Neurological:     MEDICATIONS:  MEDICATIONS  (STANDING):  chlordiazePOXIDE 75 milliGRAM(s) Oral every 8 hours  dextrose 5% + sodium chloride 0.45%. 1000 milliLiter(s) (150 mL/Hr) IV Continuous <Continuous>  multivitamin/thiamine/folic acid in sodium chloride 0.9% 1000 milliLiter(s) (150 mL/Hr) IV Continuous <Continuous>  potassium chloride    Tablet ER 40 milliEquivalent(s) Oral every 4 hours    MEDICATIONS  (PRN):  LORazepam   Injectable 2 milliGRAM(s) IV Push every 2 hours PRN CIWA>6      ALLERGIES:  Allergies    Allergy Status Unknown    Intolerances        LABS:                        12.6   5.5   )-----------( 140      ( 23 Jul 2018 02:01 )             37.5     07-23    137  |  97  |  6<L>  ----------------------------<  112<H>  3.3<L>   |  24  |  0.55    Ca    8.1<L>      23 Jul 2018 02:02  Phos  2.5     07-23  Mg     1.3     07-23    TPro  6.0  /  Alb  3.2<L>  /  TBili  1.0  /  DBili  x   /  AST  95<H>  /  ALT  38  /  AlkPhos  94  07-23    PT/INR - ( 22 Jul 2018 06:16 )   PT: 10.6 sec;   INR: 0.96          PTT - ( 22 Jul 2018 06:16 )  PTT:31.2 sec      RADIOLOGY & ADDITIONAL TESTS: Reviewed. INTERVAL HPI/OVERNIGHT EVENTS: BONNIE    SUBJECTIVE: Patient seen and examined at bedside. Patient sleeping but was arousable.  had some difficulty in understanding patient as he was mumbling. Patient states he feels okay but not great. He states he still has nausea and feels a little anxious. he also reports feeling like he has bugs crawling on his skin. He denies any visual or auditory hallucinations. Denies sweating or seizure activity.     OBJECTIVE:    VITAL SIGNS:  ICU Vital Signs Last 24 Hrs  T(C): 36.9 (23 Jul 2018 05:00), Max: 37.1 (22 Jul 2018 17:18)  T(F): 98.5 (23 Jul 2018 05:00), Max: 98.7 (22 Jul 2018 17:18)  HR: 55 (23 Jul 2018 04:07) (46 - 88)  BP: 127/77 (23 Jul 2018 04:07) (110/68 - 154/80)  BP(mean): 97 (23 Jul 2018 00:56) (97 - 97)  ABP: --  ABP(mean): --  RR: 16 (23 Jul 2018 04:07) (16 - 18)  SpO2: 98% (23 Jul 2018 04:07) (96% - 99%)        07-22 @ 07:01  -  07-23 @ 05:57  --------------------------------------------------------  IN: 0 mL / OUT: 500 mL / NET: -500 mL      CAPILLARY BLOOD GLUCOSE      POCT Blood Glucose.: 155 mg/dL (22 Jul 2018 21:30)      PHYSICAL EXAM:    Constitutional: resting comfortably in bed, NAD  HEENT: NC/AT; PERRL, anicteric sclera; no oropharyngeal erythema or exudates; MMM  Neck: supple, no JVD  Respiratory: CTA B/L, no W/R/R; respirations appear non-labored, speaking full sentences  Cardiovascular: +S1/S2, RRR  Gastrointestinal: abdomen soft, NT/ND; +BS x4  Extremities: WWP; no clubbing, cyanosis or edema  Vascular: 2+ radial, DP pulses B/L  Dermatologic: skin normal color and turgor; no visible rashes, scabs on face  Neurological: AAOx2.5. Oriented to self, location, year but not month or day    MEDICATIONS:  MEDICATIONS  (STANDING):  chlordiazePOXIDE 75 milliGRAM(s) Oral every 8 hours  dextrose 5% + sodium chloride 0.45%. 1000 milliLiter(s) (150 mL/Hr) IV Continuous <Continuous>  multivitamin/thiamine/folic acid in sodium chloride 0.9% 1000 milliLiter(s) (150 mL/Hr) IV Continuous <Continuous>  potassium chloride    Tablet ER 40 milliEquivalent(s) Oral every 4 hours    MEDICATIONS  (PRN):  LORazepam   Injectable 2 milliGRAM(s) IV Push every 2 hours PRN CIWA>6      ALLERGIES:  Allergies    Allergy Status Unknown    Intolerances        LABS:                        12.6   5.5   )-----------( 140      ( 23 Jul 2018 02:01 )             37.5     07-23    137  |  97  |  6<L>  ----------------------------<  112<H>  3.3<L>   |  24  |  0.55    Ca    8.1<L>      23 Jul 2018 02:02  Phos  2.5     07-23  Mg     1.3     07-23    TPro  6.0  /  Alb  3.2<L>  /  TBili  1.0  /  DBili  x   /  AST  95<H>  /  ALT  38  /  AlkPhos  94  07-23    PT/INR - ( 22 Jul 2018 06:16 )   PT: 10.6 sec;   INR: 0.96          PTT - ( 22 Jul 2018 06:16 )  PTT:31.2 sec      RADIOLOGY & ADDITIONAL TESTS: Reviewed. INTERVAL HPI/OVERNIGHT EVENTS: BONNIE    SUBJECTIVE: Patient seen and examined at bedside. Patient sleeping but was arousable.  had some difficulty in understanding patient as he was mumbling. Patient states he feels okay but not great. He states he still has nausea and feels a little anxious. he also reports feeling like he has bugs crawling on his skin. He denies any visual or auditory hallucinations. Denies sweating or seizure activity.     OBJECTIVE:    VITAL SIGNS:  ICU Vital Signs Last 24 Hrs  T(C): 36.9 (23 Jul 2018 05:00), Max: 37.1 (22 Jul 2018 17:18)  T(F): 98.5 (23 Jul 2018 05:00), Max: 98.7 (22 Jul 2018 17:18)  HR: 55 (23 Jul 2018 04:07) (46 - 88)  BP: 127/77 (23 Jul 2018 04:07) (110/68 - 154/80)  BP(mean): 97 (23 Jul 2018 00:56) (97 - 97)  ABP: --  ABP(mean): --  RR: 16 (23 Jul 2018 04:07) (16 - 18)  SpO2: 98% (23 Jul 2018 04:07) (96% - 99%)        07-22 @ 07:01  -  07-23 @ 05:57  --------------------------------------------------------  IN: 0 mL / OUT: 500 mL / NET: -500 mL      CAPILLARY BLOOD GLUCOSE      POCT Blood Glucose.: 155 mg/dL (22 Jul 2018 21:30)      PHYSICAL EXAM:    Constitutional: sleeping comfortably in bed, NAD  HEENT: NC/AT; PERRL, anicteric sclera; no oropharyngeal erythema or exudates; MMM  Neck: supple, no JVD  Respiratory: CTA B/L, no W/R/R; respirations appear non-labored, speaking full sentences  Cardiovascular: +S1/S2, RRR  Gastrointestinal: abdomen soft, NT/ND; +BS x4  Extremities: WWP; no clubbing, cyanosis or edema, tremors visible  Vascular: 2+ radial, DP pulses B/L  Dermatologic: skin normal color and turgor; no visible rashes, (+) scabs on face  Neurological: AAOx2.5. Oriented to self, location, year but not month or day. Easily arousable    MEDICATIONS:  MEDICATIONS  (STANDING):  chlordiazePOXIDE 75 milliGRAM(s) Oral every 8 hours  dextrose 5% + sodium chloride 0.45%. 1000 milliLiter(s) (150 mL/Hr) IV Continuous <Continuous>  multivitamin/thiamine/folic acid in sodium chloride 0.9% 1000 milliLiter(s) (150 mL/Hr) IV Continuous <Continuous>  potassium chloride    Tablet ER 40 milliEquivalent(s) Oral every 4 hours    MEDICATIONS  (PRN):  LORazepam   Injectable 2 milliGRAM(s) IV Push every 2 hours PRN CIWA>6      ALLERGIES:  Allergies    Allergy Status Unknown    Intolerances        LABS:                        12.6   5.5   )-----------( 140      ( 23 Jul 2018 02:01 )             37.5     07-23    137  |  97  |  6<L>  ----------------------------<  112<H>  3.3<L>   |  24  |  0.55    Ca    8.1<L>      23 Jul 2018 02:02  Phos  2.5     07-23  Mg     1.3     07-23    TPro  6.0  /  Alb  3.2<L>  /  TBili  1.0  /  DBili  x   /  AST  95<H>  /  ALT  38  /  AlkPhos  94  07-23    PT/INR - ( 22 Jul 2018 06:16 )   PT: 10.6 sec;   INR: 0.96          PTT - ( 22 Jul 2018 06:16 )  PTT:31.2 sec      RADIOLOGY & ADDITIONAL TESTS: Reviewed. Transfer from [facility] to [facility]:    Hospital Course:  41 yo M with a PMHx of epilepsy (on Keppra), asthma, and alcohol abuse (600-700 mL vodka/day, past intubation) who was brought in by EMS to [facility] for alcohol intoxication and was transferred to [facility] for alcohol withdrawal and bradycardia. Last drink was 7/21 evening. Patient has a history of DTs and was last     INTERVAL HPI/OVERNIGHT EVENTS: BONNIE    SUBJECTIVE: Patient seen and examined at bedside. Patient sleeping but was arousable.  had some difficulty in understanding patient as he was mumbling. Patient states he feels okay but not great. He states he still has nausea and feels a little anxious. he also reports feeling like he has bugs crawling on his skin. He denies any visual or auditory hallucinations. Denies sweating or seizure activity.     OBJECTIVE:    VITAL SIGNS:  ICU Vital Signs Last 24 Hrs  T(C): 36.9 (23 Jul 2018 05:00), Max: 37.1 (22 Jul 2018 17:18)  T(F): 98.5 (23 Jul 2018 05:00), Max: 98.7 (22 Jul 2018 17:18)  HR: 55 (23 Jul 2018 04:07) (46 - 88)  BP: 127/77 (23 Jul 2018 04:07) (110/68 - 154/80)  BP(mean): 97 (23 Jul 2018 00:56) (97 - 97)  ABP: --  ABP(mean): --  RR: 16 (23 Jul 2018 04:07) (16 - 18)  SpO2: 98% (23 Jul 2018 04:07) (96% - 99%)        07-22 @ 07:01  -  07-23 @ 05:57  --------------------------------------------------------  IN: 0 mL / OUT: 500 mL / NET: -500 mL      CAPILLARY BLOOD GLUCOSE      POCT Blood Glucose.: 155 mg/dL (22 Jul 2018 21:30)      PHYSICAL EXAM:    Constitutional: sleeping comfortably in bed, NAD  HEENT: NC/AT; PERRL, anicteric sclera; no oropharyngeal erythema or exudates; MMM  Neck: supple, no JVD  Respiratory: CTA B/L, no W/R/R; respirations appear non-labored, speaking full sentences  Cardiovascular: +S1/S2, RRR  Gastrointestinal: abdomen soft, NT/ND; +BS x4  Extremities: WWP; no clubbing, cyanosis or edema, tremors visible  Vascular: 2+ radial, DP pulses B/L  Dermatologic: skin normal color and turgor; no visible rashes, (+) scabs on face  Neurological: AAOx2.5. Oriented to self, location, year but not month or day. Easily arousable    MEDICATIONS:  MEDICATIONS  (STANDING):  chlordiazePOXIDE 75 milliGRAM(s) Oral every 8 hours  dextrose 5% + sodium chloride 0.45%. 1000 milliLiter(s) (150 mL/Hr) IV Continuous <Continuous>  multivitamin/thiamine/folic acid in sodium chloride 0.9% 1000 milliLiter(s) (150 mL/Hr) IV Continuous <Continuous>  potassium chloride    Tablet ER 40 milliEquivalent(s) Oral every 4 hours    MEDICATIONS  (PRN):  LORazepam   Injectable 2 milliGRAM(s) IV Push every 2 hours PRN CIWA>6      ALLERGIES:  Allergies    Allergy Status Unknown    Intolerances        LABS:                        12.6   5.5   )-----------( 140      ( 23 Jul 2018 02:01 )             37.5     07-23    137  |  97  |  6<L>  ----------------------------<  112<H>  3.3<L>   |  24  |  0.55    Ca    8.1<L>      23 Jul 2018 02:02  Phos  2.5     07-23  Mg     1.3     07-23    TPro  6.0  /  Alb  3.2<L>  /  TBili  1.0  /  DBili  x   /  AST  95<H>  /  ALT  38  /  AlkPhos  94  07-23    PT/INR - ( 22 Jul 2018 06:16 )   PT: 10.6 sec;   INR: 0.96          PTT - ( 22 Jul 2018 06:16 )  PTT:31.2 sec      RADIOLOGY & ADDITIONAL TESTS: Reviewed. Transfer from [facility] to [facility]:    Hospital Course:  41 yo M with a PMHx of epilepsy (on Keppra), asthma, and alcohol abuse (600-700 mL vodka/day, past intubation in last 3-4 years) who was brought in by EMS to [facility] for alcohol intoxication and was transferred to [facility] for alcohol withdrawal and bradycardia in the 40s. Last drink was 7/21 evening. Patient appears homeless and does not know who his doctor is, how  much Keppra he takes or where he gets it from. Before coming to [facility], he had received Librium 300mg PO, Ativan 9mg, Keppra 1000mg. Currently being treated with Librium 75mg PO, Ativan 2mg IV q 2hr prn, and Keppra 750 BID.     INTERVAL HPI/OVERNIGHT EVENTS: BONNIE    SUBJECTIVE: Patient seen and examined at bedside. Patient sleeping but was arousable.  had some difficulty in understanding patient as he was mumbling. Patient states he feels okay but not great. He states he still has nausea and feels a little anxious. he also reports feeling like he has bugs crawling on his skin. He denies any visual or auditory hallucinations. Denies sweating or seizure activity.     OBJECTIVE:    VITAL SIGNS:  ICU Vital Signs Last 24 Hrs  T(C): 36.9 (23 Jul 2018 05:00), Max: 37.1 (22 Jul 2018 17:18)  T(F): 98.5 (23 Jul 2018 05:00), Max: 98.7 (22 Jul 2018 17:18)  HR: 55 (23 Jul 2018 04:07) (46 - 88)  BP: 127/77 (23 Jul 2018 04:07) (110/68 - 154/80)  BP(mean): 97 (23 Jul 2018 00:56) (97 - 97)  ABP: --  ABP(mean): --  RR: 16 (23 Jul 2018 04:07) (16 - 18)  SpO2: 98% (23 Jul 2018 04:07) (96% - 99%)        07-22 @ 07:01  -  07-23 @ 05:57  --------------------------------------------------------  IN: 0 mL / OUT: 500 mL / NET: -500 mL      CAPILLARY BLOOD GLUCOSE      POCT Blood Glucose.: 155 mg/dL (22 Jul 2018 21:30)      PHYSICAL EXAM:    Constitutional: sleeping comfortably in bed, NAD  HEENT: NC/AT; PERRL, anicteric sclera; no oropharyngeal erythema or exudates; MMM  Neck: supple, no JVD  Respiratory: CTA B/L, no W/R/R; respirations appear non-labored, speaking full sentences  Cardiovascular: +S1/S2, RRR  Gastrointestinal: abdomen soft, NT/ND; +BS x4  Extremities: WWP; no clubbing, cyanosis or edema, tremors visible  Vascular: 2+ radial, DP pulses B/L  Dermatologic: skin normal color and turgor; no visible rashes, (+) scabs on face  Neurological: AAOx2.5. Oriented to self, location, year but not month or day. Easily arousable    MEDICATIONS:  MEDICATIONS  (STANDING):  chlordiazePOXIDE 75 milliGRAM(s) Oral every 8 hours  dextrose 5% + sodium chloride 0.45%. 1000 milliLiter(s) (150 mL/Hr) IV Continuous <Continuous>  multivitamin/thiamine/folic acid in sodium chloride 0.9% 1000 milliLiter(s) (150 mL/Hr) IV Continuous <Continuous>  potassium chloride    Tablet ER 40 milliEquivalent(s) Oral every 4 hours    MEDICATIONS  (PRN):  LORazepam   Injectable 2 milliGRAM(s) IV Push every 2 hours PRN CIWA>6      ALLERGIES:  Allergies    Allergy Status Unknown    Intolerances        LABS:                        12.6   5.5   )-----------( 140      ( 23 Jul 2018 02:01 )             37.5     07-23    137  |  97  |  6<L>  ----------------------------<  112<H>  3.3<L>   |  24  |  0.55    Ca    8.1<L>      23 Jul 2018 02:02  Phos  2.5     07-23  Mg     1.3     07-23    TPro  6.0  /  Alb  3.2<L>  /  TBili  1.0  /  DBili  x   /  AST  95<H>  /  ALT  38  /  AlkPhos  94  07-23    PT/INR - ( 22 Jul 2018 06:16 )   PT: 10.6 sec;   INR: 0.96          PTT - ( 22 Jul 2018 06:16 )  PTT:31.2 sec      RADIOLOGY & ADDITIONAL TESTS: Reviewed. Transfer from [facility] to [facility]:    Hospital Course:  43 yo M with a PMHx of epilepsy (on Keppra), asthma, and alcohol abuse (600-700 mL vodka/day, past intubation in last 3-4 years) who was brought in by EMS to [facility] for alcohol intoxication and was transferred to [facility] for alcohol withdrawal and bradycardia in the 40s. Last drink was 7/21 evening. Patient appears homeless and does not know who his doctor is, how  much Keppra he takes or where he gets it from. Before coming to [facility], he had received Librium 300mg PO, Ativan 9mg, Keppra 1000mg. Currently being treated with Librium 75mg PO, Ativan 2mg IV q 2hr prn, and Keppra 750 BID. Patient is now stable for stepdown to [facility].    INTERVAL HPI/OVERNIGHT EVENTS: BONNIE    SUBJECTIVE: Patient seen and examined at bedside. Patient sleeping but was arousable.  had some difficulty in understanding patient as he was mumbling. Patient states he feels okay but not great. He states he still has nausea and feels a little anxious. he also reports feeling like he has bugs crawling on his skin. He denies any visual or auditory hallucinations. Denies sweating or seizure activity.     OBJECTIVE:    VITAL SIGNS:  ICU Vital Signs Last 24 Hrs  T(C): 36.9 (23 Jul 2018 05:00), Max: 37.1 (22 Jul 2018 17:18)  T(F): 98.5 (23 Jul 2018 05:00), Max: 98.7 (22 Jul 2018 17:18)  HR: 55 (23 Jul 2018 04:07) (46 - 88)  BP: 127/77 (23 Jul 2018 04:07) (110/68 - 154/80)  BP(mean): 97 (23 Jul 2018 00:56) (97 - 97)  ABP: --  ABP(mean): --  RR: 16 (23 Jul 2018 04:07) (16 - 18)  SpO2: 98% (23 Jul 2018 04:07) (96% - 99%)        07-22 @ 07:01  -  07-23 @ 05:57  --------------------------------------------------------  IN: 0 mL / OUT: 500 mL / NET: -500 mL      CAPILLARY BLOOD GLUCOSE      POCT Blood Glucose.: 155 mg/dL (22 Jul 2018 21:30)      PHYSICAL EXAM:    Constitutional: sleeping comfortably in bed, NAD  HEENT: NC/AT; PERRL, anicteric sclera; no oropharyngeal erythema or exudates; MMM  Neck: supple, no JVD  Respiratory: CTA B/L, no W/R/R; respirations appear non-labored, speaking full sentences  Cardiovascular: +S1/S2, RRR  Gastrointestinal: abdomen soft, NT/ND; +BS x4  Extremities: WWP; no clubbing, cyanosis or edema, tremors visible  Vascular: 2+ radial, DP pulses B/L  Dermatologic: skin normal color and turgor; no visible rashes, (+) scabs on face  Neurological: AAOx2.5. Oriented to self, location, year but not month or day. Easily arousable    MEDICATIONS:  MEDICATIONS  (STANDING):  chlordiazePOXIDE 75 milliGRAM(s) Oral every 8 hours  dextrose 5% + sodium chloride 0.45%. 1000 milliLiter(s) (150 mL/Hr) IV Continuous <Continuous>  multivitamin/thiamine/folic acid in sodium chloride 0.9% 1000 milliLiter(s) (150 mL/Hr) IV Continuous <Continuous>  potassium chloride    Tablet ER 40 milliEquivalent(s) Oral every 4 hours    MEDICATIONS  (PRN):  LORazepam   Injectable 2 milliGRAM(s) IV Push every 2 hours PRN CIWA>6      ALLERGIES:  Allergies    Allergy Status Unknown    Intolerances        LABS:                        12.6   5.5   )-----------( 140      ( 23 Jul 2018 02:01 )             37.5     07-23    137  |  97  |  6<L>  ----------------------------<  112<H>  3.3<L>   |  24  |  0.55    Ca    8.1<L>      23 Jul 2018 02:02  Phos  2.5     07-23  Mg     1.3     07-23    TPro  6.0  /  Alb  3.2<L>  /  TBili  1.0  /  DBili  x   /  AST  95<H>  /  ALT  38  /  AlkPhos  94  07-23    PT/INR - ( 22 Jul 2018 06:16 )   PT: 10.6 sec;   INR: 0.96          PTT - ( 22 Jul 2018 06:16 )  PTT:31.2 sec      RADIOLOGY & ADDITIONAL TESTS: Reviewed.

## 2018-07-23 NOTE — PROGRESS NOTE ADULT - PROBLEM SELECTOR PLAN 2
Patient with HR in the high 40s; on presentation HR was in high 50s.  - on tele  - EKG  - bradycardia may be exacerbated by benzo/Keppra use Patient with HR in the high 40s; on presentation HR was in high 50s. Still in high 50s  - on tele  - EKG  - bradycardia may be exacerbated by benzo/Keppra use

## 2018-07-23 NOTE — H&P ADULT - PROBLEM SELECTOR PLAN 5
F: On D5 1/2NS @150  E: check K, Mg; replete to 4 and 2  N: regular diet  PPX: IMPROVE score 0; SCD boots  FULL CODE

## 2018-07-23 NOTE — H&P ADULT - PROBLEM SELECTOR PLAN 2
Patient with HR in the high 40s; on presentation HR was in high 50s.  - on tele  - EKG  - bradycardia may be exacerbated by benzo/Keppra use

## 2018-07-23 NOTE — PROGRESS NOTE ADULT - PROBLEM SELECTOR PLAN 3
Patient with HR in the high 40s; on presentation HR was in high 50s, been in 60s currently  - EKG if bradycardic  - bradycardia may be exacerbated by benzo/Keppra use

## 2018-07-23 NOTE — PROGRESS NOTE ADULT - PROBLEM SELECTOR PLAN 1
Patient with past intubation 3-4 years ago. CIWA 14  - frequent CIWAs, treat CIWA >6 with Ativan 2 mg IV PRN  - Librium 75 mg q8h PO, taper  - MVI, folate, thiamine

## 2018-07-23 NOTE — PROGRESS NOTE ADULT - ASSESSMENT
41 yo M with a PMHx of epilepsy (on Keppra), asthma, and alcohol abuse (600-700 mL vodka/day, past intubation) who was brought in by EMS to [facility] for alcohol intoxication and was transferred to [facility] for alcohol withdrawal and bradycardia. Last drink was 7/21 evening.

## 2018-07-23 NOTE — H&P ADULT - PROBLEM SELECTOR PLAN 1
Patient with past intubation 3-4 years ago. CIWA 6.   - frequent CIWAs, treat CIWA >6 with Ativan 2 mg IV PRN  - Librium 75 mg q8h PO  - MVI, folate, thiamine

## 2018-07-23 NOTE — PROGRESS NOTE ADULT - SUBJECTIVE AND OBJECTIVE BOX
43 yo M with a PMHx of epilepsy (on Keppra), asthma, and alcohol abuse who was brought in by EMS to [facility] for alcohol intoxication.  on presentation. Last drink was 2 days ago. prior hx of DTs and intubation 2 years ago. Pt with withdrawal symptoms in ER. Given total of 300 mg Librium PO and 9 mg Ativan IV in ER and also for unclear reason given 1000 mg Keppra. Pt became bradycardic to the upper 40s requiring tele admission.   On presentation, pt with CIWA 6-7    Physical exam- Citizen of [place] speaking. Slow speech. Dilated pupils but reactive. Facial erythema noted. Bradycardic with no m/r/g. Poor inspiratory effort with no crackles or wheezing auscultated. Abd soft, nt/nd with + BS. Extremities well perfused with 2+ pulses.     Labs- Anemia, K 3.3, Mg 1.3     Plan- Will start on Librium taper with 75mg q8h and prn 2mg q2h Ativan.  - Continue tele monitoring for bradycardiac. EKG with NSR    Case discussed with Dr. Rios 43 yo M with a PMHx of epilepsy (on Keppra), asthma, and alcohol abuse who was brought in by EMS to [facility] for alcohol intoxication.  on presentation. Last drink was 2 days ago. prior hx of DTs and intubation 2 years ago. Pt with withdrawal symptoms in ER. Given total of 300 mg Librium PO and 9 mg Ativan IV in ER and also for unclear reason given 1000 mg Keppra. Pt became bradycardic to the upper 40s requiring tele admission.   On presentation, pt with CIWA 6-7    Physical exam- [place] speaking. Slow speech. Dilated pupils but reactive. Facial erythema noted. Bradycardic with no m/r/g. Poor inspiratory effort with no crackles or wheezing auscultated. Abd soft, nt/nd with + BS. Extremities well perfused with 2+ pulses.     Labs- Anemia, K 3.3, Mg 1.3     Plan- Will start on Librium taper with 75mg q8h and prn 2mg q2h Ativan.  - Continue tele monitoring for bradycardiac. EKG with NSR    Case discussed with Dr. Rios    [facility] ATTENDING    Patient seen and discussed with House Officer/Resident  Chart and history reviewed  Exam, labs, and radiology as noted above   Assessment and Plans as outlined  Currently patient's mentation is appropriate   Protecting airway   Breathing is non-labored without increased work of breathing --- no intercostal accessory muscle use and no paradoxical abdominal motion evident   Ventilating and oxygenating adequately without increased work of breathing  Hemodynamically stable---clinically perfusing adequately  Aim to maintain MAP >= 65 mmHg, U/O >= 0.5 ml/kg/hr, pulse oximetry OxSat >= 92%   Metabolic demands along with any abnormal blood chemistries, and fluid requirements being addressed    Sedation and/or pain meds as needed to reduce metabolic demand and maintain comfort   GI/DVT prophylaxis

## 2018-07-23 NOTE — PROGRESS NOTE ADULT - PROBLEM SELECTOR PLAN 4
history of asthma; does not currently sound wheezy or feel SOB.   - albuterol PRN -Starting 21mg Nicotine Patch

## 2018-07-23 NOTE — PROGRESS NOTE ADULT - PROBLEM SELECTOR PLAN 4
Pt unsure of home dose of Keppra though says he takes it 3 times a day.  - consider starting on 500 Keppra bid in AM Pt unsure of home dose of Keppra though says he takes it 3 times a day. Patient is unsure of who prescribes him the Keppra and is unable to state which pharmacy he goes to.  - consider starting on 500 Keppra bid in AM Pt unsure of home dose of Keppra though says he takes it 3 times a day. Patient is unsure of who prescribes him the Keppra and is unable to state which pharmacy he goes to.  - consider starting on 500 Keppra TID Pt unsure of home dose of Keppra though says he takes it 3 times a day. Patient is unsure of who prescribes him the Keppra and is unable to state which pharmacy he goes to.  - started on Keppra 750mg BID

## 2018-07-23 NOTE — PROGRESS NOTE ADULT - ASSESSMENT
41 yo M with a PMHx of epilepsy (on Keppra), asthma, and alcohol abuse (600-700 mL vodka/day, past intubation) who was brought in by EMS to [facility] for alcohol intoxication and was transferred to [facility] for alcohol withdrawal and bradycardia. Last drink was 7/21 evening.

## 2018-07-23 NOTE — H&P ADULT - NSHPPHYSICALEXAM_GEN_ALL_CORE
PHYSICAL EXAM:   VITAL SIGNS:  T(C): 36.7 (07-23-18 @ 00:56), Max: 37.1 (07-22-18 @ 17:18)  HR: 46 (07-23-18 @ 00:56) (46 - 88)  BP: 117/88 (07-23-18 @ 00:56) (110/68 - 154/80)  RR: 16 (07-23-18 @ 00:56) (16 - 18)  SpO2: 97% (07-23-18 @ 00:56) (96% - 99%)  Constitutional: resting comfortably in bed; NAD. Patient with visible tremor in hands as well as tongue. No sweating; does not appear anxious or agitated.   Head: NC/AT  Eyes: pupils dilated but reactive to light, EOMI, clear conjunctiva  ENT: no nasal discharge; MMM  Respiratory: CTA B/L; no W/R/R  Cardiac: +S1/S2; RRR; no M/R/G  Gastrointestinal: soft, NT/ND; no rebound or guarding  Extremities: WWP, no clubbing or cyanosis; no peripheral edema  Vascular: 2+ radial, DP/PT pulses B/L  Dermatologic: skin warm, dry and intact; no rashes, wounds, or scars  Neurologic: AAOx2; CNII-XII grossly intact; no focal deficits

## 2018-07-23 NOTE — H&P ADULT - HISTORY OF PRESENT ILLNESS
The patient is a 43 yo M with a PMHx of epilepsy (on Keppra), asthma, and alcohol abuse (multiple prior hospitalizations, was intubated 3-4 years ago) who was brought in by EMS to [facility] for alcohol intoxication. The patient drinks 600-700 mL of vodka per day and admitted to drinking heavily prior to arriving in the ED. On interview, however, the patient does not remember why he went to the ED, saying he thinks he was brought in after having a seizure but with no memory of where he was or what he was doing. In the The patient is a 43 yo M with a PMHx of epilepsy (on Keppra), asthma, and alcohol abuse who was brought in by EMS to [facility] for alcohol intoxication. The patient drinks 600-700 mL of vodka per day and admitted to drinking heavily prior to arriving in the ED. He has been to detox in the past and has had multiple prior hospitalization and has been intubated at least one time 3-4 years ago. On interview, however, the patient does not remember why he went to the ED, saying he thinks he was brought in after having a seizure but with no memory of where he was or what he was doing. His last drink was 7/21 in the afternoon; he does not know what  time. At [facility] ED, the patient was 127/84, HR 59, RR 18, AF, O2Sat 100% on RA. The patient had an alcohol level of 299 and was intoxicated on arrival. The following day the patient began to withdraw with CIWAs around 10. He was given a total of 300 mg Librium PO and 9 mg Ativan IV from 6 AM to 10 PM as well as 1000 mg Keppra given his epilepsy history. He was also noted to have bradycardia to the upper 40s in the last few hours in the ED. The patient was transferred to [facility] for further management.     On interview with patient, he endorses headache, nausea, tremulousness, and chest pain reproducible on palpation. He denies vomiting, anxiety, visual, auditory, or tactile hallucinations. CIWA 6.

## 2018-07-23 NOTE — PROGRESS NOTE ADULT - PROBLEM SELECTOR PLAN 5
F: On D5 1/2NS @150  E: check K, Mg; replete to 4 and 2  N: regular diet  PPX: IMPROVE score 0; SCD boots  FULL CODE history of asthma; does not currently sound wheezy or feel SOB.   - albuterol PRN

## 2018-07-23 NOTE — H&P ADULT - PROBLEM SELECTOR PLAN 4
Pt unsure of home dose of Keppra though says he takes it 3 times a day.  - consider starting on 500 Keppra bid in AM

## 2018-07-23 NOTE — H&P ADULT - NSHPLABSRESULTS_GEN_ALL_CORE
LABS:     CBC: 5.5>12.6<L>/37.5<L><140<L>   07-23-18 @ 02:01, 5.6>14.4/42.4<173   07-22-18 @ 06:16  BMP: 144 / 3.4 / 106 / 27 / 5 / 0.55 < 87  Ca 8.7 Mg 1.9 PO4 --   07-22-18 @ 06:16  LIVER FUNCTIONS - ( 22 Jul 2018 06:16 )  Alb: 3.5 g/dL / Pro: 7.7 g/dL / ALK PHOS: 112 U/L / ALT: 60 U/L / AST: 116 U/L / GGT: x           PT/INR - ( 22 Jul 2018 06:16 )   PT: 10.6 sec;   INR: 0.96     PTT - ( 22 Jul 2018 06:16 )  PTT:31.2 sec    Alcohol, Blood: 299 (07.22.18 @ 06:16)  < from: CT Head No Cont (07.21.18 @ 21:54) >    IMPRESSION:      1. No acute intracranial hemorrhage or territorial infarction.    2. Mild cerebral atrophy and mild nonspecific dilatation of the   ventricles, as above.    < end of copied text >

## 2018-07-23 NOTE — PROGRESS NOTE ADULT - PROBLEM SELECTOR PLAN 1
Patient with past intubation 3-4 years ago. CIWA 6.   - frequent CIWAs, treat CIWA >6 with Ativan 2 mg IV PRN  - Librium 75 mg q8h PO  - MVI, folate, thiamine Patient with past intubation 3-4 years ago. CIWA 14  - frequent CIWAs, treat CIWA >6 with Ativan 2 mg IV PRN  - Librium 75 mg q8h PO  - MVI, folate, thiamine Patient with past intubation 3-4 years ago. CIWA 14  - frequent CIWAs, treat CIWA >6 with Ativan 2 mg IV PRN  - Librium 75 mg q8h PO, taper  - MVI, folate, thiamine

## 2018-07-23 NOTE — PROGRESS NOTE ADULT - PROBLEM SELECTOR PLAN 2
Pt unsure of home dose of Keppra though says he takes it 3 times a day. Patient is unsure of who prescribes him the Keppra and is unable to state which pharmacy he goes to.  - started on Keppra 750mg BID Pt unsure of home dose of Keppra though says he takes it 3 times a day. Patient is unsure of who prescribes him the Keppra home regimen is 500mg BID  - started on Keppra 500mg BID

## 2018-07-23 NOTE — H&P ADULT - NSHPREVIEWOFSYSTEMS_GEN_ALL_CORE
CONSTITUTIONAL: No weakness, fevers or chills  EYES/ENT: No visual changes;  No vertigo or throat pain   NECK: No pain or stiffness  RESPIRATORY: No cough, wheezing, hemoptysis; No shortness of breath  CARDIOVASCULAR: +chest pain, no palpitations  GASTROINTESTINAL: No abdominal or epigastric pain. +nausea, no vomiting, or hematemesis; No diarrhea or constipation. No melena or hematochezia.  GENITOURINARY: No dysuria, frequency or hematuria  NEUROLOGICAL: No numbness or weakness  SKIN: No itching, rashes

## 2018-07-24 LAB
ANION GAP SERPL CALC-SCNC: 16 MMOL/L — SIGNIFICANT CHANGE UP (ref 5–17)
BUN SERPL-MCNC: 5 MG/DL — LOW (ref 7–23)
CALCIUM SERPL-MCNC: 9.4 MG/DL — SIGNIFICANT CHANGE UP (ref 8.4–10.5)
CHLORIDE SERPL-SCNC: 102 MMOL/L — SIGNIFICANT CHANGE UP (ref 96–108)
CO2 SERPL-SCNC: 21 MMOL/L — LOW (ref 22–31)
CREAT SERPL-MCNC: 0.52 MG/DL — SIGNIFICANT CHANGE UP (ref 0.5–1.3)
GLUCOSE SERPL-MCNC: 97 MG/DL — SIGNIFICANT CHANGE UP (ref 70–99)
HCT VFR BLD CALC: 42.9 % — SIGNIFICANT CHANGE UP (ref 39–50)
HGB BLD-MCNC: 13.9 G/DL — SIGNIFICANT CHANGE UP (ref 13–17)
MAGNESIUM SERPL-MCNC: 1.9 MG/DL — SIGNIFICANT CHANGE UP (ref 1.6–2.6)
MCHC RBC-ENTMCNC: 32.4 G/DL — SIGNIFICANT CHANGE UP (ref 32–36)
MCHC RBC-ENTMCNC: 33.3 PG — SIGNIFICANT CHANGE UP (ref 27–34)
MCV RBC AUTO: 102.6 FL — HIGH (ref 80–100)
PLATELET # BLD AUTO: 143 K/UL — LOW (ref 150–400)
POTASSIUM SERPL-MCNC: 3.9 MMOL/L — SIGNIFICANT CHANGE UP (ref 3.5–5.3)
POTASSIUM SERPL-SCNC: 3.9 MMOL/L — SIGNIFICANT CHANGE UP (ref 3.5–5.3)
RBC # BLD: 4.18 M/UL — LOW (ref 4.2–5.8)
RBC # FLD: 14 % — SIGNIFICANT CHANGE UP (ref 10.3–16.9)
SODIUM SERPL-SCNC: 139 MMOL/L — SIGNIFICANT CHANGE UP (ref 135–145)
WBC # BLD: 5.8 K/UL — SIGNIFICANT CHANGE UP (ref 3.8–10.5)
WBC # FLD AUTO: 5.8 K/UL — SIGNIFICANT CHANGE UP (ref 3.8–10.5)

## 2018-07-24 PROCEDURE — 99233 SBSQ HOSP IP/OBS HIGH 50: CPT | Mod: GC

## 2018-07-24 RX ORDER — POTASSIUM CHLORIDE 20 MEQ
40 PACKET (EA) ORAL EVERY 4 HOURS
Refills: 0 | Status: DISCONTINUED | OUTPATIENT
Start: 2018-07-24 | End: 2018-07-24

## 2018-07-24 RX ORDER — MAGNESIUM SULFATE 500 MG/ML
2 VIAL (ML) INJECTION ONCE
Refills: 0 | Status: DISCONTINUED | OUTPATIENT
Start: 2018-07-24 | End: 2018-07-24

## 2018-07-24 RX ORDER — SODIUM CHLORIDE 9 MG/ML
1000 INJECTION, SOLUTION INTRAVENOUS
Refills: 0 | Status: DISCONTINUED | OUTPATIENT
Start: 2018-07-24 | End: 2018-07-24

## 2018-07-24 RX ORDER — POTASSIUM CHLORIDE 20 MEQ
10 PACKET (EA) ORAL ONCE
Refills: 0 | Status: COMPLETED | OUTPATIENT
Start: 2018-07-24 | End: 2018-07-24

## 2018-07-24 RX ORDER — FOLIC ACID 0.8 MG
1 TABLET ORAL DAILY
Refills: 0 | Status: DISCONTINUED | OUTPATIENT
Start: 2018-07-24 | End: 2018-07-28

## 2018-07-24 RX ORDER — THIAMINE MONONITRATE (VIT B1) 100 MG
100 TABLET ORAL DAILY
Refills: 0 | Status: DISCONTINUED | OUTPATIENT
Start: 2018-07-24 | End: 2018-07-26

## 2018-07-24 RX ORDER — LANOLIN ALCOHOL/MO/W.PET/CERES
5 CREAM (GRAM) TOPICAL AT BEDTIME
Refills: 0 | Status: DISCONTINUED | OUTPATIENT
Start: 2018-07-24 | End: 2018-07-28

## 2018-07-24 RX ADMIN — Medication 5 MILLIGRAM(S): at 23:04

## 2018-07-24 RX ADMIN — Medication 2 MILLIGRAM(S): at 22:58

## 2018-07-24 RX ADMIN — Medication 2 MILLIGRAM(S): at 21:24

## 2018-07-24 RX ADMIN — Medication 1 TABLET(S): at 13:29

## 2018-07-24 RX ADMIN — Medication 1 MILLIGRAM(S): at 13:29

## 2018-07-24 RX ADMIN — LEVETIRACETAM 500 MILLIGRAM(S): 250 TABLET, FILM COATED ORAL at 18:36

## 2018-07-24 RX ADMIN — Medication 20 MILLIGRAM(S): at 13:29

## 2018-07-24 RX ADMIN — Medication 75 MILLIGRAM(S): at 06:31

## 2018-07-24 RX ADMIN — LEVETIRACETAM 500 MILLIGRAM(S): 250 TABLET, FILM COATED ORAL at 06:31

## 2018-07-24 RX ADMIN — Medication 1 PATCH: at 13:28

## 2018-07-24 RX ADMIN — Medication 50 MILLIGRAM(S): at 18:36

## 2018-07-24 RX ADMIN — Medication 100 MILLIGRAM(S): at 13:29

## 2018-07-24 RX ADMIN — Medication 10 MILLIEQUIVALENT(S): at 19:10

## 2018-07-24 NOTE — CHART NOTE - NSCHARTNOTEFT_GEN_A_CORE
Hospital Course:  43 yo M with a PMHx of epilepsy (on Keppra), asthma, and alcohol abuse (600-700 mL vodka/day, past intubation in last 3-4 years) who was brought in by EMS to [facility] for alcohol intoxication and was transferred to [facility] for alcohol withdrawal and bradycardia in the 40s. Last drink was 7/21 evening. 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 Before coming to [facility], he had received Librium 300mg PO, Ativan 9mg, Keppra 1000mg. Currently being treated with Librium 75mg PO q12hr, Ativan 2mg IV q 2hr prn, and Keppra 500 BID. Patient was deemed stable for stepdown to [facility] and transferred on (7/23).

## 2018-07-24 NOTE — PROGRESS NOTE ADULT - ASSESSMENT
43 yo M with a PMHx of epilepsy (on Keppra), asthma, and alcohol abuse (600-700 mL vodka/day, past intubation) who was brought in by EMS to [facility] for alcohol intoxication and was transferred to [facility] for alcohol withdrawal and bradycardia. Last drink was 7/21 evening.

## 2018-07-24 NOTE — PROGRESS NOTE ADULT - PROBLEM SELECTOR PLAN 1
Patient with past intubation 3-4 years ago. CIWA 6 today  - frequent CIWAs, treat CIWA >8 with Ativan 2 mg IV PRN  - Librium 75 mg q8h PO, taper  - MVI, folate, thiamine

## 2018-07-24 NOTE — PHYSICAL THERAPY INITIAL EVALUATION ADULT - GAIT DEVIATIONS NOTED, PT EVAL
fairly steady ambulating straight however unsteady during turns, patient unable to keep walker closer and required verbal/visual cues, no loss of balance, +tremors generally

## 2018-07-24 NOTE — PROGRESS NOTE ADULT - PROBLEM SELECTOR PLAN 4
Pt unsure of home dose of Keppra though says he takes it 3 times a day. Patient is unsure of who prescribes him the Keppra he receives it from a [facility] shelter on [address]  - started on Keppra 500mg BID

## 2018-07-24 NOTE — PROGRESS NOTE ADULT - PROBLEM SELECTOR PLAN 2
Patient with HR in the high 40s; on presentation HR was in high 50s. Still in high 50s  - EKG if bradycardic

## 2018-07-24 NOTE — PHYSICAL THERAPY INITIAL EVALUATION ADULT - PERTINENT HX OF CURRENT PROBLEM, REHAB EVAL
42 year old male brought in by EMS to [facility] for alcohol intoxication and was transferred to [facility] for alcohol withdrawal and bradycardia.

## 2018-07-24 NOTE — PROGRESS NOTE ADULT - SUBJECTIVE AND OBJECTIVE BOX
BRETT VAZQUEZ 42y Male    Interval HPI:  No acute events overnight. Patient was transferred from  to  yesterday evening. CIWA on evaluation this morning was 6. Patient denied any headaches, tactile, auditory or visual hallucinations.     Patient seen and examined at bedside:    T(C): 36.8 (07-24-18 @ 04:57), Max: 37.2 (07-23-18 @ 14:31)  HR: 77 (07-24-18 @ 04:57) (62 - 77)  BP: 124/81 (07-24-18 @ 04:57) (116/64 - 140/74)  RR: 16 (07-24-18 @ 04:57) (15 - 19)  SpO2: 97% (07-24-18 @ 04:57) (97% - 99%)    Review of systems negative except as mentioned above    chlordiazePOXIDE 75 milliGRAM(s) Oral every 8 hours  levETIRAcetam 500 milliGRAM(s) Oral two times a day  LORazepam   Injectable 2 milliGRAM(s) IV Push every 2 hours PRN  multivitamin/thiamine/folic acid in sodium chloride 0.9% 1000 milliLiter(s) IV Continuous <Continuous>  nicotine - 21 mG/24Hr(s) Patch 1 patch Transdermal daily  PARoxetine 20 milliGRAM(s) Oral daily      Physical Exam:    GENERAL: Lying in bed seems slightly confused  HEENT: Normocephalic, healing abrasion on right temporal region, MMM, PERRL  NECK: Supple, no JVD, no LAD  RESPIRATORY: CTAB  CV: S1S2 present, RRR, no m/r/g  GI: Soft, NT/ND, normal active bowel sounds  EXT: Slight tremor at rest improved from yesterday tremor with arms extended also improved from yesterday. 2+ pulses in upper and lower ext B/L, no cyanosis, no edema  NEURO: AOx3  MSK: 4+ strength in upper and lower ext B/L, NROM in all ext      Labs:                        13.9   5.8   )-----------( 143      ( 24 Jul 2018 07:32 )             42.9     07-24    139  |  102  |  5<L>  ----------------------------<  97  3.9   |  21<L>  |  0.52    Ca    9.4      24 Jul 2018 07:32  Phos  2.4     07-23  Mg     1.9     07-24    TPro  6.0  /  Alb  3.3  /  TBili  0.9  /  DBili  x   /  AST  84<H>  /  ALT  37  /  AlkPhos  97  07-23          CAPILLARY BLOOD GLUCOSE                Imaging:

## 2018-07-25 DIAGNOSIS — T14.91XA SUICIDE ATTEMPT, INITIAL ENCOUNTER: ICD-10-CM

## 2018-07-25 DIAGNOSIS — F10.231 ALCOHOL DEPENDENCE WITH WITHDRAWAL DELIRIUM: ICD-10-CM

## 2018-07-25 LAB
FOLATE SERPL-MCNC: 17.2 NG/ML — SIGNIFICANT CHANGE UP
GLUCOSE BLDC GLUCOMTR-MCNC: 136 MG/DL — HIGH (ref 70–99)
VIT B12 SERPL-MCNC: 680 PG/ML — SIGNIFICANT CHANGE UP (ref 232–1245)

## 2018-07-25 PROCEDURE — 99223 1ST HOSP IP/OBS HIGH 75: CPT

## 2018-07-25 PROCEDURE — 99233 SBSQ HOSP IP/OBS HIGH 50: CPT | Mod: GC

## 2018-07-25 RX ORDER — HALOPERIDOL DECANOATE 100 MG/ML
2 INJECTION INTRAMUSCULAR EVERY 4 HOURS
Refills: 0 | Status: DISCONTINUED | OUTPATIENT
Start: 2018-07-25 | End: 2018-07-25

## 2018-07-25 RX ORDER — HALOPERIDOL DECANOATE 100 MG/ML
2 INJECTION INTRAMUSCULAR EVERY 6 HOURS
Refills: 0 | Status: DISCONTINUED | OUTPATIENT
Start: 2018-07-25 | End: 2018-07-28

## 2018-07-25 RX ADMIN — Medication 5 MILLIGRAM(S): at 21:44

## 2018-07-25 RX ADMIN — Medication 50 MILLIGRAM(S): at 21:44

## 2018-07-25 RX ADMIN — HALOPERIDOL DECANOATE 2 MILLIGRAM(S): 100 INJECTION INTRAMUSCULAR at 16:13

## 2018-07-25 RX ADMIN — Medication 2 MILLIGRAM(S): at 08:00

## 2018-07-25 RX ADMIN — Medication 75 MILLIGRAM(S): at 06:27

## 2018-07-25 RX ADMIN — Medication 1 PATCH: at 11:40

## 2018-07-25 RX ADMIN — HALOPERIDOL DECANOATE 2 MILLIGRAM(S): 100 INJECTION INTRAMUSCULAR at 12:09

## 2018-07-25 RX ADMIN — Medication 1 PATCH: at 12:52

## 2018-07-25 RX ADMIN — Medication 20 MILLIGRAM(S): at 11:40

## 2018-07-25 RX ADMIN — Medication 2 MILLIGRAM(S): at 05:05

## 2018-07-25 RX ADMIN — Medication 1 MILLIGRAM(S): at 11:40

## 2018-07-25 RX ADMIN — Medication 1 TABLET(S): at 11:40

## 2018-07-25 RX ADMIN — LEVETIRACETAM 500 MILLIGRAM(S): 250 TABLET, FILM COATED ORAL at 18:17

## 2018-07-25 RX ADMIN — Medication 50 MILLIGRAM(S): at 13:01

## 2018-07-25 RX ADMIN — HALOPERIDOL DECANOATE 2 MILLIGRAM(S): 100 INJECTION INTRAMUSCULAR at 21:44

## 2018-07-25 RX ADMIN — LEVETIRACETAM 500 MILLIGRAM(S): 250 TABLET, FILM COATED ORAL at 06:27

## 2018-07-25 RX ADMIN — Medication 100 MILLIGRAM(S): at 11:40

## 2018-07-25 NOTE — PROGRESS NOTE ADULT - PROBLEM SELECTOR PLAN 1
Overnight agitiated and was choking himself with his sheets and was turning purple. ciwa was >= 22  -Fu Psych recs today  -Librium increased to 75 mg q8h PO  -Pt on 1:1 Overnight agitiated and was choking himself with his sheets and was turning purple. Unable to access CIWA due to extreme agitation this AM.   -Fu Psych recs today  -C/w Librium to 50 mg q8h PO  -Continue Pt on 1:1  -Pt has Haldol 2mg q4hr PRN for agitation

## 2018-07-25 NOTE — BEHAVIORAL HEALTH ASSESSMENT NOTE - PROBLEM SELECTOR PLAN 1
Suggest increasing standing Librium to 75 mg q 6hrs given high requirement for prn Ativan (12 mg over the past 24hrs).   May use Ativan 2 mg IV q 2 hrs prn. If pt has stable VS and is not responding to Ativan may use  Haldol 2 mg po/IM q 6hrs prn agitation  Continue 1:1  Consider transfer to monitored unit given high risk of complicated withdrawal.   Complete delirium work up as per primary team.

## 2018-07-25 NOTE — PROGRESS NOTE ADULT - PROBLEM SELECTOR PLAN 6
F: On D5 1/2NS @150  E: check K, Mg; replete to 4 and 2  N: regular diet  PPX: IMPROVE score 0; SCD boots  FULL CODE F: none  E: check K, Mg; replete to 4 and 2  N: regular diet  PPX: IMPROVE score 0; SCD boots  FULL CODE

## 2018-07-25 NOTE — BEHAVIORAL HEALTH ASSESSMENT NOTE - RISK ASSESSMENT
Pt is at high risk for DT"s, complicated withdrawal Pt is at high risk for DTs, complicated withdrawal, would benefit from higher level of monitoring

## 2018-07-25 NOTE — CHART NOTE - NSCHARTNOTEFT_GEN_A_CORE
Patient had a multiple episodes of extreme agitation     at 9 pm was paged that patient was agitated. Patient only speaks Ugandan.  Patient ciwa was >= 22  based on anxiety, tremors, and agitation. Patient unable to calm down  given the 2mg of ativan IM@ 9:26pm  as day team ordered.    got paged around 1030 pm stating that the patient is agitiated and was choking himself with his sheets and was turning purple. ciwa was >= 22 again this time patient was give 2mg ativan IV @10:58pm.   Psych consult order was placed, discussed with team.  patient calmed down      got paged around 420 pm for patient was agitated. Patient was disoriented patient thought he was in a shelter, could not be reoriented. CIWA was at >= 26. Patient had to be held down with the help of 3 PCAs and 2mg ativan IV was given at 4:45pm.

## 2018-07-25 NOTE — PROVIDER CONTACT NOTE (OTHER) - ASSESSMENT
Pt agitated and tremulous. VSS. Pt on enhanced supervision. Pt agitated, restless and tremulous. VSS.

## 2018-07-25 NOTE — DIETITIAN INITIAL EVALUATION ADULT. - OTHER INFO
42M admitted for ETOH abuse/ withdrawal, hx otherwise noteable for epilepsy. Pt with extreme agitation, confusion, sucidal attempt last night 7/24. Unable to obtain UBW, food preferences from pt + perform education d/t pt current state. No noted n/v/d/c, chewing/ swallowing, pain impacting intake, skin noted to be WNL. Will follow for PO tolerance/ assessment when pt stable.

## 2018-07-25 NOTE — BEHAVIORAL HEALTH ASSESSMENT NOTE - FAMILY HISTORY OF SUBSTANCE ABUSE
PIV started x1 attempt. Blood obtained and sent to lab. Pt tolerated well. Flu swab sent to lab.   Family updated on poc. Denies further needs at this time.      Unable to assess

## 2018-07-25 NOTE — PROGRESS NOTE ADULT - PROBLEM SELECTOR PLAN 5
Pt unsure of home dose of Keppra though says he takes it 3 times a day.  - consider starting on 500 Keppra bid in AM Pt unsure of home dose of Keppra though says he takes it 3 times a day.  - consider starting on 500 Keppra bid

## 2018-07-25 NOTE — BEHAVIORAL HEALTH ASSESSMENT NOTE - HPI (INCLUDE ILLNESS QUALITY, SEVERITY, DURATION, TIMING, CONTEXT, MODIFYING FACTORS, ASSOCIATED SIGNS AND SYMPTOMS)
The patient is a 41 yo Martiniquais speaking M with a PMHx of ?epilepsy (on Keppra), asthma, and alcohol abuse who was brought in by EMS to [facility] for alcohol intoxication. The patient drinks 600-700 mL of vodka per day and admitted to drinking heavily prior to arriving in the ED. He has been to detox in the past and has had multiple prior hospitalization and has been intubated at least one time 3-4 years ago. On interview, however, the patient does not remember why he went to the ED, saying he thinks he was brought in after having a seizure but with no memory of where he was or what he was doing. His last drink was 7/21 in the afternoon; he does not know what  time. At [facility] ED, the patient was 127/84, HR 59, RR 18, AF, O2Sat 100% on RA. The patient had an alcohol level of 299 and was intoxicated on arrival. The following day the patient began to withdraw with CIWAs around 10. He was given a total of 300 mg Librium PO and 9 mg Ativan IV from 6 AM to 10 PM as well as 1000 mg Keppra given his epilepsy history. He was also noted to have bradycardia to the upper 40s in the last few hours in the ED. The patient was transferred to [facility] for further management.   Overnight pt had a multiple episodes of extreme agitation. 9pm Night team alerted dt agitation, Patient ciwa was >= 22  based on anxiety, tremors, and agitation. Patient unable to calm down  given the 2mg of ativan IM@ 9:26pm. At 1030 pm patient is agitated and was choking himself with his sheets and was turning purple. ciwa was >= 22 again this time patient was given 2mg ativan IV @10:58pm.  At 420 pm patient was agitated again. Patient was disoriented thought he was in a shelter, could not be reoriented. CIWA was at >= 26. Patient had to be held down with the help of 3 PCAs and 2mg ativan IV was given at 4:45pm.    Sub: Pt was encountered in hallway trying to leave hospital, disoriented, threatening to staff and threatening to throw himself in front of train. Pt redirected to room, spoke via  Martiniquais  phone, Pt unstable on feet. Has 1:1 aide, 2mg IM ativan.    Pt was seen today with 1:1 at bedside. He was lethargic, mumbling, unable to participate in the interview. He was disoriented, stating he is in "[place]" in a "shelter". Of note pt received Haldol prior to this evaluation.   Over the past 24 hrs pt received:  Ativan total dose: 12 mg  Librium total dose: 175 mg  Haldol total dose: 4 mg

## 2018-07-25 NOTE — DIETITIAN INITIAL EVALUATION ADULT. - ENERGY NEEDS
ABW used for calculations as pt between % of IBW.   IBW 75kg, 81% IBW, BMI 19.5   Normal adult needs used

## 2018-07-25 NOTE — BEHAVIORAL HEALTH ASSESSMENT NOTE - SUMMARY
The patient is a 43 yo Djiboutian speaking M with a PMHx of ?epilepsy (on Keppra), asthma, and alcohol dependence, who was brought in by EMS to [facility] for alcohol intoxication. Currently pt is presenting with confusion, agitation, impulsive behavior highly suggestive of delirium secondary to ETOH withdrawal. Pt has received Ativan 12 mg total dose over the past 24 hrs in addition to standing librium suggesting the need to increase the standing Librium dose. Pt does not have any other identifiable cause for delirium. He does have hx/o complicated withdrawal in the past suggesting the need for higher dose benzo's and slow taper. Pt would benefir from being observed on monitored floor given complicated withdrawal, hx/o sz's and DT's in the past. The patient is a 41 yo Beninese speaking M with a PMHx of ?epilepsy (on Keppra), asthma, and alcohol dependence, who was brought in by EMS to [facility] for alcohol intoxication. Currently pt is presenting with confusion, agitation, impulsive behavior highly suggestive of delirium secondary to ETOH withdrawal. Pt has received Ativan 12 mg total dose over the past 24 hrs in addition to standing librium suggesting the need to increase the standing Librium dose. Pt does not have any other identifiable cause for delirium other then ETOH withdrawal. He does have hx/o complicated withdrawal in the past suggesting the need for higher dose benzo's and slower taper. Pt would benefit from being observed on monitored unit given complicated withdrawal, hx/o sz's and DT's in the past.

## 2018-07-25 NOTE — PROVIDER CONTACT NOTE (OTHER) - ACTION/TREATMENT ORDERED:
Pt given melatonin. Pt given ativan IM by MD Arroyo. Placed on constant observation. Psych consulted. Will continue to monitor.

## 2018-07-25 NOTE — PROGRESS NOTE ADULT - ASSESSMENT
43 yo M with a PMHx of epilepsy (on Keppra), asthma, and alcohol abuse (600-700 mL vodka/day, past intubation) who was brought in by EMS to [facility] for alcohol intoxication and was transferred to [facility] for alcohol withdrawal and bradycardia. Multiple episodes of agitation requiring sedation and restraints overnight, with active suicidal attempt. Pt is likely past the window for alcohol withdrawal (last drink 7/21). 41 yo M with a PMHx of epilepsy (on Keppra), asthma, and alcohol abuse (600-700 mL vodka/day, past intubation) who was brought in by EMS to [facility] for alcohol intoxication and was [facility] for alcohol withdrawal and bradycardia. After downgrade to floors Pt suddenly having multiple episodes of agitation requiring sedation and restraints overnight, with active suicidal attempt. Pt is likely past the window for alcohol withdrawal (last drink 7/21), concerning for atypical or extended alcohol withdrawal vs new diagnosis.

## 2018-07-25 NOTE — PROGRESS NOTE ADULT - PROBLEM SELECTOR PLAN 2
Patient with past intubation 3-4 years ago. CIWA 6.   - frequent CIWAs, treat CIWA >6 with Ativan 2 mg IV PRN  - Librium 75 mg q8h PO  - MVI, folate, thiamine Patient with past intubation 3-4 years ago. CIWA 6.   - frequent CIWAs, treat CIWA >6 with Ativan 2 mg IV PRN  - Librium 50 mg q8h PO  - MVI, folate, thiamine Patient with alcohol withdrawal requiring intubation 3-4 years ago.   - frequent CIWAs, treat CIWA >6 with Ativan 2 mg IV PRN  -Pt has Haldol 2mg q4hr PRN for agitation  - Librium 50 mg q8h PO  - MVI, folate, thiamine

## 2018-07-25 NOTE — BEHAVIORAL HEALTH ASSESSMENT NOTE - NSBHCHARTREVIEWVS_PSY_A_CORE FT
Vital Signs Last 24 Hrs  T(C): 36.6 (25 Jul 2018 15:13), Max: 36.8 (24 Jul 2018 21:51)  T(F): 97.8 (25 Jul 2018 15:13), Max: 98.3 (24 Jul 2018 21:51)  HR: 105 (25 Jul 2018 15:13) (67 - 105)  BP: 129/81 (25 Jul 2018 15:13) (125/82 - 139/84)  BP(mean): --  RR: 18 (25 Jul 2018 15:13) (18 - 20)  SpO2: 99% (25 Jul 2018 15:13) (98% - 100%)

## 2018-07-25 NOTE — PROGRESS NOTE ADULT - PROBLEM SELECTOR PLAN 3
Patient with HR in the high 40s; on presentation HR was in high 50s.  - on tele  - EKG  - bradycardia may be exacerbated by benzo/Keppra use Patient with HR in the high 40s; on presentation HR was in high 50s.  - No events on tele or EKG on admission  - bradycardia may be exacerbated by benzo/Keppra use Patient with HR in the high 40s; on presentation HR was in high 50s. HR stable since admission   - No events captured while on tele or EKG on admission  - bradycardia may be exacerbated by benzo/Keppra use

## 2018-07-25 NOTE — PROGRESS NOTE ADULT - SUBJECTIVE AND OBJECTIVE BOX
INTERVAL HPI/OVERNIGHT EVENTS:    O/n: Patient had a multiple episodes of extreme agitatio. 9pm Night team alerted dt agitation, Patient ciwa was >= 22  based on anxiety, tremors, and agitation. Patient unable to calm down  given the 2mg of ativan IM@ 9:26pm  as day team ordered.  1030 pm stating that the patient is agitiated and was choking himself with his sheets and was turning purple. ciwa was >= 22 again this time patient was give 2mg ativan IV @10:58pm.   Psych consult order was placed, discussed with team.  patient calmed down got paged around 420 pm for patient was agitated. Patient was disoriented patient thought he was in a shelter, could not be reoriented. CIWA was at >= 26. Patient had to be held down with the help of 3 PCAs and 2mg ativan IV was given at 4:45pm.      Sub: Pt was encountered in hallway trying to leave hospital, disoriented, threatening to staff and threatening to throw himself in front of train. Pt redirected to room, spoke via  [employer]  phone, Pt unstable on feet. Has 1:1 aide, 2mg IM ativan given stat.     VITAL SIGNS:  T(F): 98.1 (07-25-18 @ 05:30)  HR: 75 (07-25-18 @ 05:30)  BP: 125/82 (07-25-18 @ 05:30)  RR: 20 (07-25-18 @ 05:30)  SpO2: 99% (07-25-18 @ 05:30)  Wt(kg): --    PHYSICAL EXAM:    Constitutional: ungroomed, agitated   HEENT: PERRL, EOMI, sclera non-icteric, neck supple, trachea midline, no masses, no JVD, MMM, good dentition  Respiratory: CTA b/l, good air entry b/l, no wheezing, no rhonchi, no rales, without accessory muscle use and no intercostal retractions  Cardiovascular: RRR, normal S1S2, no M/R/G  Gastrointestinal: soft, NTND, no masses palpable, BS normal  Extremities: Warm, well perfused, pulses equal bilateral upper and lower extremities, no edema, no clubbing  Neurological: AAOx3, CN Grossly intact  Skin: Normal temperature, warm, dry    MEDICATIONS  (STANDING):  chlordiazePOXIDE 75 milliGRAM(s) Oral every 8 hours  folic acid 1 milliGRAM(s) Oral daily  levETIRAcetam 500 milliGRAM(s) Oral two times a day  melatonin 5 milliGRAM(s) Oral at bedtime  multivitamin 1 Tablet(s) Oral daily  nicotine - 21 mG/24Hr(s) Patch 1 patch Transdermal daily  PARoxetine 20 milliGRAM(s) Oral daily  thiamine 100 milliGRAM(s) Oral daily    MEDICATIONS  (PRN):  LORazepam   Injectable 2 milliGRAM(s) IntraMuscular every 4 hours PRN CIWA >8      Allergies    Allergy Status Unknown    Intolerances        LABS:                        13.9   5.8   )-----------( 143      ( 24 Jul 2018 07:32 )             42.9     07-24    139  |  102  |  5<L>  ----------------------------<  97  3.9   |  21<L>  |  0.52    Ca    9.4      24 Jul 2018 07:32  Mg     1.9     07-24            RADIOLOGY & ADDITIONAL TESTS:  Reviewed INTERVAL HPI/OVERNIGHT EVENTS:    O/n: Patient had a multiple episodes of extreme agitatio. 9pm Night team alerted dt agitation, Patient ciwa was >= 22  based on anxiety, tremors, and agitation. Patient unable to calm down  given the 2mg of ativan IM@ 9:26pm  as day team ordered.  1030 pm stating that the patient is agitiated and was choking himself with his sheets and was turning purple. ciwa was >= 22 again this time patient was give 2mg ativan IV @10:58pm.   Psych consult order was placed, discussed with team.  patient calmed down got paged around 420 pm for patient was agitated. Patient was disoriented patient thought he was in a shelter, could not be reoriented. CIWA was at >= 26. Patient had to be held down with the help of 3 PCAs and 2mg ativan IV was given at 4:45pm.      Sub: Pt was encountered in hallway trying to leave hospital, disoriented, threatening to staff and threatening to throw himself in front of train. Pt redirected to room, spoke via  [employer]  phone, Pt unstable on feet. Has 1:1 aide, 2mg IM ativan given stat.     VITAL SIGNS:  T(F): 98.1 (07-25-18 @ 05:30)  HR: 75 (07-25-18 @ 05:30)  BP: 125/82 (07-25-18 @ 05:30)  RR: 20 (07-25-18 @ 05:30)  SpO2: 99% (07-25-18 @ 05:30)  Wt(kg): --    PHYSICAL EXAM:    Constitutional: ungroomed, agitated   HEENT: PERRL, EOMI, sclera non-icteric, neck supple, trachea midline, no masses, no JVD, MMM, good dentition  Respiratory: CTA b/l, good air entry b/l, no wheezing, no rhonchi, no rales, without accessory muscle use and no intercostal retractions  Cardiovascular: RRR, normal S1S2, no M/R/G  Gastrointestinal: soft, NTND, no masses palpable, BS normal  Extremities: Warm, well perfused, pulses equal bilateral upper and lower extremities, no edema, no clubbing  Neurological: AAOx2, CN Grossly intact  Skin: Normal temperature, warm, dry    MEDICATIONS  (STANDING):  chlordiazePOXIDE 75 milliGRAM(s) Oral every 8 hours  folic acid 1 milliGRAM(s) Oral daily  levETIRAcetam 500 milliGRAM(s) Oral two times a day  melatonin 5 milliGRAM(s) Oral at bedtime  multivitamin 1 Tablet(s) Oral daily  nicotine - 21 mG/24Hr(s) Patch 1 patch Transdermal daily  PARoxetine 20 milliGRAM(s) Oral daily  thiamine 100 milliGRAM(s) Oral daily    MEDICATIONS  (PRN):  LORazepam   Injectable 2 milliGRAM(s) IntraMuscular every 4 hours PRN CIWA >8      Allergies    Allergy Status Unknown    Intolerances        LABS:                        13.9   5.8   )-----------( 143      ( 24 Jul 2018 07:32 )             42.9     07-24    139  |  102  |  5<L>  ----------------------------<  97  3.9   |  21<L>  |  0.52    Ca    9.4      24 Jul 2018 07:32  Mg     1.9     07-24            RADIOLOGY & ADDITIONAL TESTS:  Reviewed INTERVAL HPI/OVERNIGHT EVENTS:    O/n: Patient had a multiple episodes of extreme agitatio. 9pm Night team alerted dt agitation, Patient ciwa was >= 22  based on anxiety, tremors, and agitation. Patient unable to calm down  given the 2mg of ativan IM@ 9:26pm  as day team ordered.  1030 pm stating that the patient is agitiated and was choking himself with his sheets and was turning purple. ciwa was >= 22 again this time patient was give 2mg ativan IV @10:58pm.   Psych consult order was placed, discussed with team.  patient calmed down got paged around 420 pm for patient was agitated. Patient was disoriented patient thought he was in a shelter, could not be reoriented. CIWA was at >= 26. Patient had to be held down with the help of 3 PCAs and 2mg ativan IV was given at 4:45pm.      Sub: Pt was encountered in hallway trying to leave hospital, agitated, threatening to staff and declaring he will throw himself in front of train. Pt redirected to room, spoke via  [employer]  phone: Pt is suspicious of hospital staff, believes he is in a shelter in [place].  Pt unstable on feet. Has 1:1 aide, 2mg IM ativan given stat.     VITAL SIGNS:  T(F): 98.1 (07-25-18 @ 05:30)  HR: 75 (07-25-18 @ 05:30)  BP: 125/82 (07-25-18 @ 05:30)  RR: 20 (07-25-18 @ 05:30)  SpO2: 99% (07-25-18 @ 05:30)  Wt(kg): --    PHYSICAL EXAM:    Constitutional: ungroomed, agitated   HEENT: PERRL, EOMI, sclera non-icteric, neck supple, trachea midline, no masses, no JVD, MMM, good dentition  Respiratory: CTA b/l, good air entry b/l, no wheezing, no rhonchi, no rales, without accessory muscle use and no intercostal retractions  Cardiovascular: RRR, normal S1S2, no M/R/G  Gastrointestinal: soft, NTND, no masses palpable, BS normal  Extremities: Warm, well perfused, pulses equal bilateral upper and lower extremities, no edema, no clubbing  Neurological: AAOx2, CN Grossly intact  Skin: Normal temperature, warm, dry    MEDICATIONS  (STANDING):  chlordiazePOXIDE 75 milliGRAM(s) Oral every 8 hours  folic acid 1 milliGRAM(s) Oral daily  levETIRAcetam 500 milliGRAM(s) Oral two times a day  melatonin 5 milliGRAM(s) Oral at bedtime  multivitamin 1 Tablet(s) Oral daily  nicotine - 21 mG/24Hr(s) Patch 1 patch Transdermal daily  PARoxetine 20 milliGRAM(s) Oral daily  thiamine 100 milliGRAM(s) Oral daily    MEDICATIONS  (PRN):  LORazepam   Injectable 2 milliGRAM(s) IntraMuscular every 4 hours PRN CIWA >8      Allergies    Allergy Status Unknown    Intolerances        LABS:                        13.9   5.8   )-----------( 143      ( 24 Jul 2018 07:32 )             42.9     07-24    139  |  102  |  5<L>  ----------------------------<  97  3.9   |  21<L>  |  0.52    Ca    9.4      24 Jul 2018 07:32  Mg     1.9     07-24            RADIOLOGY & ADDITIONAL TESTS:  Reviewed

## 2018-07-26 PROCEDURE — 99231 SBSQ HOSP IP/OBS SF/LOW 25: CPT

## 2018-07-26 PROCEDURE — 99233 SBSQ HOSP IP/OBS HIGH 50: CPT | Mod: GC

## 2018-07-26 RX ORDER — THIAMINE MONONITRATE (VIT B1) 100 MG
500 TABLET ORAL
Refills: 0 | Status: DISCONTINUED | OUTPATIENT
Start: 2018-07-26 | End: 2018-07-28

## 2018-07-26 RX ADMIN — LEVETIRACETAM 500 MILLIGRAM(S): 250 TABLET, FILM COATED ORAL at 06:17

## 2018-07-26 RX ADMIN — Medication 25 MILLIGRAM(S): at 13:21

## 2018-07-26 RX ADMIN — Medication 25 MILLIGRAM(S): at 21:32

## 2018-07-26 RX ADMIN — Medication 20 MILLIGRAM(S): at 11:58

## 2018-07-26 RX ADMIN — Medication 50 MILLIGRAM(S): at 06:17

## 2018-07-26 RX ADMIN — LEVETIRACETAM 500 MILLIGRAM(S): 250 TABLET, FILM COATED ORAL at 17:35

## 2018-07-26 RX ADMIN — Medication 1 PATCH: at 11:57

## 2018-07-26 RX ADMIN — Medication 1 PATCH: at 11:58

## 2018-07-26 RX ADMIN — Medication 500 MILLIGRAM(S): at 15:25

## 2018-07-26 RX ADMIN — Medication 1 TABLET(S): at 11:58

## 2018-07-26 RX ADMIN — Medication 500 MILLIGRAM(S): at 21:32

## 2018-07-26 RX ADMIN — Medication 1 MILLIGRAM(S): at 11:58

## 2018-07-26 RX ADMIN — Medication 5 MILLIGRAM(S): at 21:32

## 2018-07-26 NOTE — PROGRESS NOTE BEHAVIORAL HEALTH - SUMMARY
The patient is a 43 yo Slovak speaking M with a PMHx of ?epilepsy (on Keppra), asthma, and alcohol dependence, who was brought in by EMS to [facility] for alcohol intoxication. Pt continues with confusion, highly suggestive of delirium secondary to ETOH withdrawal, though with reduced agitation or impulsive behavior. Pt has received Ativan 12 mg total dose over the past 24 hrs in addition to standing librium suggesting the need to increase the standing Librium dose. Pt does not have any other identifiable cause for delirium other then ETOH withdrawal. He does have hx/o complicated withdrawal in the past suggesting the need for higher dose benzo's and slower taper. Pt would benefit from being observed on monitored unit given complicated withdrawal, hx/o sz's and DT's in the past. The patient is a 43 yo Dutch speaking M with a PMHx of ?epilepsy (on Keppra), asthma, depression,  alcohol and nicotine dependence, who was brought in by EMS to [facility] for alcohol intoxication. Pt initially is less confused (A+O x 3), still appears lethargic, highly suggestive of delirium secondary to ETOH withdrawal, though with reduced agitation or impulsive behavior.   Recommend continuing C.O. for safety, continuing librium taper and vitamins, as well as prn lorzepam for breakthrough WD (not needed x 24 hours), prn Haldol. The patient is a 41 yo Panamanian speaking M with a PMHx of ?epilepsy (on Keppra), asthma, depression,  alcohol and nicotine dependence, who was brought in by EMS to [facility] for alcohol intoxication. Pt initially very confused and agitated, CIWA> 26, required multiple prns of IV lorazepam as well as librium. Today, pt is less confused (A+O x 3), still appears lethargic, highly suggestive of delirium secondary to ETOH withdrawal, though with reduced agitation or impulsive behavior.   Recommend continuing C.O. for safety, continuing librium taper and vitamins, as well as prn lorzepam for breakthrough WD (not needed x 24 hours), prn Haldol.

## 2018-07-26 NOTE — PROGRESS NOTE ADULT - PROBLEM SELECTOR PLAN 2
Patient with alcohol withdrawal requiring intubation 3-4 years ago.   - frequent CIWAs, treat CIWA >6 with Ativan 2 mg IV PRN  -Pt has Haldol 2mg q4hr PRN for agitation  - Librium 50 mg q8h PO  - MVI, folate, thiamine Patient with alcohol withdrawal requiring intubation 3-4 years ago. CIWA 8 this AM  Plan is to downtitrate librium requirements to see Pt at baseline  -Pt has Haldol 2mg q4hr PRN for agitation  - Librium 25 mg q8h PO  -Increased thiamine to 500  - MVI, folate Patient with alcohol withdrawal requiring intubation 3-4 years ago. CIWA 8 this AM  Plan is to down titrate librium requirements to see Pt at baseline  -Pt has Haldol 2mg q4hr PRN for agitation  - Librium 25 mg q8h PO  -Increased thiamine to 500  - MVI, folate

## 2018-07-26 NOTE — PROGRESS NOTE ADULT - PROBLEM SELECTOR PLAN 6
F: none  E: check K, Mg; replete to 4 and 2  N: regular diet  PPX: IMPROVE score 0; SCD boots  FULL CODE

## 2018-07-26 NOTE — PROGRESS NOTE ADULT - SUBJECTIVE AND OBJECTIVE BOX
INTERVAL HPI/OVERNIGHT EVENTS:  No events overnight. Received Haldol at 9pm and melatonin.     Patient was seen and examined at bedside via  phone. Per night aide, slept well overnight, unsteady on feet going to bathroom. Pt is OxAx3, discussed he was upset at being handled roughly by staff, is fixated on not having his clothes and shoes. States he no longer has desire to self-harm, endorses history of anxiety and depressive thoughts and would like to speak to Psych again. CIWA 8. No complaints at this time. Patient denies: fever, chills, dizziness, weakness, HA, Changes in vision, CP, palpitations, SOB, cough, N/V/D/C, dysuria, changes in bowel movements, LE edema. ROS otherwise negative.    VITAL SIGNS:  T(F): 97 (07-26-18 @ 09:47)  HR: 87 (07-26-18 @ 09:47)  BP: 100/69 (07-26-18 @ 09:47)  RR: 18 (07-26-18 @ 09:47)  SpO2: 98% (07-26-18 @ 09:47)  Wt(kg): --    PHYSICAL EXAM:    Constitutional: thin poorly groomed man   HEENT: PERRL, EOMI, sclera non-icteric, neck supple, trachea midline, no masses, no JVD, MMM, good dentition  Respiratory: CTA b/l, good air entry b/l, no wheezing, no rhonchi, no rales, without accessory muscle use and no intercostal retractions  Cardiovascular: RRR, normal S1S2, no M/R/G  Gastrointestinal: soft, NTND, no masses palpable, BS normal  Extremities: Warm, well perfused, pulses equal bilateral upper and lower extremities, no edema, no clubbing  Neurological: AAOx3, CN Grossly intact  Skin: Normal temperature, warm, dry    MEDICATIONS  (STANDING):  chlordiazePOXIDE 25 milliGRAM(s) Oral every 8 hours  folic acid 1 milliGRAM(s) Oral daily  levETIRAcetam 500 milliGRAM(s) Oral two times a day  melatonin 5 milliGRAM(s) Oral at bedtime  multivitamin 1 Tablet(s) Oral daily  nicotine - 21 mG/24Hr(s) Patch 1 patch Transdermal daily  PARoxetine 20 milliGRAM(s) Oral daily  thiamine 500 milliGRAM(s) Oral <User Schedule>    MEDICATIONS  (PRN):  haloperidol    Injectable 2 milliGRAM(s) IV Push every 6 hours PRN agitation      Allergies    Allergy Status Unknown    Intolerances        LABS:                RADIOLOGY & ADDITIONAL TESTS:  Reviewed

## 2018-07-26 NOTE — PROGRESS NOTE ADULT - ASSESSMENT
41 yo M with a PMHx of epilepsy (on Keppra), asthma, and alcohol abuse (600-700 mL vodka/day, past intubation) who was brought in by EMS to [facility] for alcohol intoxication and was admittedto [facility] for alcohol withdrawal and bradycardia. After downgrade to floors Pt suddenly having multiple episodes of agitation requiring sedation and restraints overnight, with active suicidal attempt. Pt is likely past the window for alcohol withdrawal (last drink 7/21), concerning for atypical or extended alcohol withdrawal vs new diagnosis.

## 2018-07-26 NOTE — PROGRESS NOTE BEHAVIORAL HEALTH - OTHER
unable to participate in the interview secondary to confusion lethargic unable to assess "fifty-fifty" unable to assess--in bed

## 2018-07-26 NOTE — PROGRESS NOTE ADULT - PROBLEM SELECTOR PLAN 3
Patient with HR in the high 40s; on presentation HR was in high 50s. HR stable since admission   - No events captured while on tele or EKG on admission  - bradycardia may be exacerbated by benzo/Keppra use Resolved/ Patient with HR in the high 40s; on presentation HR was in high 50s. HR stable since admission   - No events captured while on tele or EKG on admission  - bradycardia may be exacerbated by benzo/Keppra use

## 2018-07-26 NOTE — PROGRESS NOTE ADULT - ATTENDING COMMENTS
Taper librium to 50mg q12h, c/w ativan prn.  C/w MVI/thiamine/FA.  Bradycardia seems to have improved.  Rest as above. PT f/up.
Toxic metabolic encephalopathy 2/2 to alcohol intoxication on admission and persisted after 2/2 to alcohol withdrawal.
 #[id number] used  patient seen and examined    reviewed vs, labs, ekg     shx: denies drug use, lives in homeless shelter     agree w/ PE findings as above w/ additions/ exceptions/ pertinent findings:   gen: asleep, awoken, in NAD  heent: perrla, MMM, +tongue fasciculations; no JVD  cvs: s1s2  lungs: CTA b;l   abd: nt/nd  ext: +scabbed knee abrasions b/l  neuro: +hand tremors, CIWA~6 (mild HA, reports feeling anxious, mild nausea, hand tremors    1. ETOH withdrawl: c/w librium standing and ativan PRN (due for next librium dose)  2. c/w keprra, clarify dose w/ outside provider     rest of plan as above

## 2018-07-26 NOTE — PROGRESS NOTE ADULT - PROBLEM SELECTOR PLAN 5
Pt unsure of home dose of Keppra though says he takes it 3 times a day.  - consider starting on 500 Keppra bid Pt unsure of home dose of Keppra though says he takes it 3 times a day.  - On 500 Keppra bid

## 2018-07-26 NOTE — PROGRESS NOTE ADULT - PROBLEM SELECTOR PLAN 1
Overnight agitiated and was choking himself with his sheets and was turning purple. Unable to access CIWA due to extreme agitation this AM.   -Fu Psych recs today  -C/w Librium to 50 mg q8h PO  -Continue Pt on 1:1  -Pt has Haldol 2mg q4hr PRN for agitation On 7/25 was found agitated and was choking himself with his sheets and was turning purple. Pt would like to speak to psych again re: depression/anxiety   -Fu psych consult   -Continue Pt on 1:1  -Pt has Haldol 2mg q6hr PRN for agitation

## 2018-07-26 NOTE — PROGRESS NOTE BEHAVIORAL HEALTH - NSBHFUPINTERVALHXFT_PSY_A_CORE
Pt observed in bed, C.O. aide in attendance.     When asked how he is feeling, he replies, "I am fifty-fifty." Pt does not recall meeting Dr. Londono yesterday (even though they conversed in Honduran).    Per KYE stewart, pt has been in excellent behavioral control all day, with no episodes of confusion or combativeness. Pt observed in bed, C.O. aide in attendance.     When asked how he is feeling, he replies, "I am fifty-fifty." Pt does not recall meeting Dr. Londono yesterday (even though they conversed in Anguillan). currently denies any thoughts of self-harm or suicidality.    Per KYE stewart, pt has been in excellent behavioral control all day, with no episodes of confusion or combativeness. Pt observed in bed, C.O. aide in attendance.     When asked how he is feeling, he replies, "I am fifty-fifty." Pt does not recall meeting Dr. Londono yesterday (even though they conversed in Grenadian). currently denies any thoughts of self-harm or suicidality.    Per GEMOMireya stewart, pt has been in excellent behavioral control all day, with no episodes of confusion or combativeness.    Currently tolerating librium 25 mg po q 8 hours with no need for lorazepam prn for breakthrough withdrawal. Last prn of Haldol was 2 mg IVP x 1 at 9 PM last night. Pt observed in bed, C.O. aide in attendance.     When asked how he is feeling, he replies, "I am fifty-fifty." Pt does not recall meeting Dr. Londono yesterday (even though they conversed in S[place]omean). currently denies any thoughts of self-harm or suicidality.    Per MOSES.OMireya stewart, pt has been in excellent behavioral control all day, with no episodes of confusion or combativeness.    Currently tolerating librium 25 mg po q 8 hours with no need for lorazepam prn for breakthrough withdrawal. Last prn of Haldol was 2 mg IVP x 1 at 9 PM last night.    CIWA 8

## 2018-07-27 DIAGNOSIS — E83.42 HYPOMAGNESEMIA: ICD-10-CM

## 2018-07-27 DIAGNOSIS — E83.39 OTHER DISORDERS OF PHOSPHORUS METABOLISM: ICD-10-CM

## 2018-07-27 PROCEDURE — 99233 SBSQ HOSP IP/OBS HIGH 50: CPT

## 2018-07-27 PROCEDURE — 99233 SBSQ HOSP IP/OBS HIGH 50: CPT | Mod: GC

## 2018-07-27 RX ADMIN — Medication 5 MILLIGRAM(S): at 22:43

## 2018-07-27 RX ADMIN — Medication 1 MILLIGRAM(S): at 11:28

## 2018-07-27 RX ADMIN — Medication 500 MILLIGRAM(S): at 22:42

## 2018-07-27 RX ADMIN — Medication 1 PATCH: at 11:28

## 2018-07-27 RX ADMIN — Medication 25 MILLIGRAM(S): at 17:29

## 2018-07-27 RX ADMIN — Medication 1 TABLET(S): at 11:28

## 2018-07-27 RX ADMIN — Medication 500 MILLIGRAM(S): at 14:56

## 2018-07-27 RX ADMIN — Medication 500 MILLIGRAM(S): at 07:08

## 2018-07-27 RX ADMIN — LEVETIRACETAM 500 MILLIGRAM(S): 250 TABLET, FILM COATED ORAL at 17:29

## 2018-07-27 RX ADMIN — Medication 25 MILLIGRAM(S): at 06:24

## 2018-07-27 RX ADMIN — Medication 20 MILLIGRAM(S): at 11:28

## 2018-07-27 RX ADMIN — LEVETIRACETAM 500 MILLIGRAM(S): 250 TABLET, FILM COATED ORAL at 06:24

## 2018-07-27 NOTE — PROGRESS NOTE BEHAVIORAL HEALTH - NSBHCONSULTRECOMMENDOTHER_PSY_A_CORE FT
Pt would benefit from substance abuse treatment vs [facility]
Pt would benefit from substance abuse treatment vs [facility] once he is medically stable.

## 2018-07-27 NOTE — PROGRESS NOTE BEHAVIORAL HEALTH - RISK ASSESSMENT
Pt currently denies any suicidal ideation/intent/plan. Pt however is at falls risk as he is observed to be unsteady on his feet while ambulating with walker
Pt currently denies any suicidal ideation/intent/plan, is in better behavioral control with only one prn dose of Haldol last night. Recommend continuing C.O. for at least overnight, given seriousness of recent episode (trying to strangle himself with sheet) and resolving delirium.

## 2018-07-27 NOTE — PROGRESS NOTE ADULT - SUBJECTIVE AND OBJECTIVE BOX
INTERVAL HPI/OVERNIGHT EVENTS:  No events overnight. No haldol given for agitation    Patient was seen and examined at bedside with Barbadian-speaking aide. CIWA 6 (nausea, moderate anxiety, numbness in hands and feet, mild headache). Pt is pleasant and cooperative this am. States plan to return to shelter. Patient denies: fever, chills, dizziness, weakness, HA, Changes in vision, CP, palpitations, SOB, cough, N/V/D/C, dysuria, changes in bowel movements, LE edema. ROS otherwise negative.    VITAL SIGNS:  T(F): 97 (07-26-18 @ 09:47)  HR: 87 (07-26-18 @ 09:47)  BP: 100/69 (07-26-18 @ 09:47)  RR: 18 (07-26-18 @ 09:47)  SpO2: 98% (07-26-18 @ 09:47)  Wt(kg): --    PHYSICAL EXAM:    Constitutional: thin poorly groomed man   HEENT: PERRL, EOMI, sclera non-icteric, neck supple, trachea midline, no masses, no JVD, MMM, good dentition  Respiratory: CTA b/l, good air entry b/l, no wheezing, no rhonchi, no rales, without accessory muscle use and no intercostal retractions  Cardiovascular: RRR, normal S1S2, no M/R/G  Gastrointestinal: soft, NTND, no masses palpable, BS normal  Extremities: Warm, well perfused, pulses equal bilateral upper and lower extremities, no edema, no clubbing  Neurological: AAOx3, CN Grossly intact  Skin: Normal temperature, warm, dry    MEDICATIONS  (STANDING):  chlordiazePOXIDE 25 milliGRAM(s) Oral every 8 hours  folic acid 1 milliGRAM(s) Oral daily  levETIRAcetam 500 milliGRAM(s) Oral two times a day  melatonin 5 milliGRAM(s) Oral at bedtime  multivitamin 1 Tablet(s) Oral daily  nicotine - 21 mG/24Hr(s) Patch 1 patch Transdermal daily  PARoxetine 20 milliGRAM(s) Oral daily  thiamine 500 milliGRAM(s) Oral <User Schedule>    MEDICATIONS  (PRN):  haloperidol    Injectable 2 milliGRAM(s) IV Push every 6 hours PRN agitation      Allergies    Allergy Status Unknown    Intolerances        LABS:                RADIOLOGY & ADDITIONAL TESTS:  Reviewed

## 2018-07-27 NOTE — PROGRESS NOTE BEHAVIORAL HEALTH - NSBHADMITCOUNSEL_PSY_A_CORE
instructions for management, treatment and follow up/risk factor reduction
diagnostic results/impressions and/or recommended studies/instructions for management, treatment and follow up/importance of adherence to chosen treatment/risk factor reduction

## 2018-07-27 NOTE — PROGRESS NOTE ADULT - PROBLEM SELECTOR PLAN 3
Resolved/ Patient with HR in the high 40s; on presentation HR was in high 50s. HR stable since admission   - No events captured while on tele or EKG on admission  - bradycardia may be exacerbated by benzo/Keppra use

## 2018-07-27 NOTE — PROGRESS NOTE BEHAVIORAL HEALTH - SUMMARY
The patient is a 41 yo Micronesian speaking M with a PMHx of ?epilepsy (on Keppra), asthma, depression,  alcohol and nicotine dependence, who was brought in by EMS to [facility] for alcohol intoxication. Pt initially very confused and agitated, CIWA> 26, required multiple prns of IV lorazepam as well as librium. Today, pt is clam and cooperative, oriented X3, denying SI/plan or intent. Pt is ambivalent about substance abuse treatment. He was advised to speak with SW at the shelter about referrals to outpatient substance abuse program.

## 2018-07-27 NOTE — PROGRESS NOTE BEHAVIORAL HEALTH - NSBHCHARTREVIEWVS_PSY_A_CORE FT
ICU Vital Signs Last 24 Hrs    VSS    T(C): 36.8 (26 Jul 2018 15:24), Max: 36.8 (26 Jul 2018 15:24)  T(F): 98.3 (26 Jul 2018 15:24), Max: 98.3 (26 Jul 2018 15:24)  HR: 70 (26 Jul 2018 15:24) (65 - 87)  BP: 111/75 (26 Jul 2018 15:24) (100/65 - 111/75)  BP(mean): --  ABP: --  ABP(mean): --  RR: 18 (26 Jul 2018 15:24) (18 - 19)  SpO2: 97% (26 Jul 2018 15:24) (97% - 100%)
Vital Signs Last 24 Hrs  T(C): 36.6 (27 Jul 2018 09:02), Max: 36.8 (26 Jul 2018 15:24)  T(F): 97.8 (27 Jul 2018 09:02), Max: 98.3 (26 Jul 2018 15:24)  HR: 61 (27 Jul 2018 09:02) (60 - 71)  BP: 107/67 (27 Jul 2018 09:02) (107/67 - 114/78)  BP(mean): --  RR: 16 (27 Jul 2018 09:02) (16 - 18)  SpO2: 99% (27 Jul 2018 09:02) (96% - 99%)

## 2018-07-27 NOTE — PROGRESS NOTE ADULT - PROBLEM SELECTOR PLAN 2
Patient with alcohol withdrawal requiring intubation 3-4 years ago. CIWA 6 this AM. Will continue to downtitrate librium requirements   -Pt has Haldol 2mg q4hr PRN for agitation  - Librium 25 mg BID PO  -C/w thiamine to 500  - MVI, folate Patient with alcohol withdrawal requiring intubation 3-4 years ago. CIWA 6 this AM. Will continue to downtitrate librium requirements   -Pt has Haldol 2mg q4hr PRN for agitation  - Librium 25 mg BID PO  -C/w thiamine to 500  - MVI, folate  -Cleared by PT to walk with walker On 7/25 was found agitated and was choking himself with his sheets and was turning purple. Pt would like to speak to psych again re: depression/anxiety   -Per psych would require outpatient treatment with  for depression/anxiety, has had a suicide attempt ~4yrs ago. Currently not at risk for self-harm  -Started on Paroxetine 20 daily   -Continue Pt on 1:1  -Pt has Haldol 2mg q6hr PRN for agitation

## 2018-07-27 NOTE — PROGRESS NOTE BEHAVIORAL HEALTH - NSBHCONSULTFOLLOWAFTERCARE_PSY_A_CORE FT
Pt will follow up with SW at [facility]. He is declining substance abuse program referrals at this time.

## 2018-07-27 NOTE — PROGRESS NOTE ADULT - PROBLEM SELECTOR PROBLEM 6
Nutrition, metabolism, and development symptoms
Nutrition, metabolism, and development symptoms
Hypomagnesemia
Nutrition, metabolism, and development symptoms

## 2018-07-27 NOTE — PROGRESS NOTE ADULT - ASSESSMENT
41 yo M with a PMHx of epilepsy (on Keppra), asthma, and alcohol abuse (600-700 mL vodka/day, past intubation) who was brought in by EMS to [facility] for alcohol intoxication and was admittedto [facility] for alcohol withdrawal and bradycardia. After downgrade to floors Pt suddenly having multiple episodes of agitation requiring sedation and restraints overnight, with active suicidal attempt. Pt is likely past the window for alcohol withdrawal (last drink 7/21), concerning for atypical or extended alcohol withdrawal vs new diagnosis. 43 yo M with a PMHx of epilepsy (on Keppra), asthma, and alcohol abuse (600-700 mL vodka/day, past intubation) who was brought in by EMS to [facility] for alcohol intoxication and was admittedto [facility] for alcohol withdrawal and bradycardia. After downgrade to floors Pt suddenly having multiple episodes of agitation requiring sedation and restraints overnight, with active suicidal attempt. Pt now not agitated, mentation improving, tolerating librium taper, not active suicide threat.

## 2018-07-27 NOTE — PROGRESS NOTE BEHAVIORAL HEALTH - PROBLEM SELECTOR PLAN 1
Complete taper as per primary team.   No evidence of SI at this time.   Pt however is at falls risk and would benefit from ES to prevent falls.

## 2018-07-27 NOTE — PROGRESS NOTE ADULT - PROBLEM SELECTOR PLAN 1
On 7/25 was found agitated and was choking himself with his sheets and was turning purple. Pt would like to speak to psych again re: depression/anxiety   -Per psych would require outpatient treatment with  for depression/anxiety, has had a suicide attempt ~4yrs ago. Currently not at risk for self-harm  -Started on Paroxetine 20 daily   -Continue Pt on 1:1  -Pt has Haldol 2mg q6hr PRN for agitation Patient with alcohol withdrawal requiring intubation 3-4 years ago. CIWA 6 this AM. Will continue to downtitrate librium requirements   -Pt has Haldol 2mg q4hr PRN for agitation  - Librium 25 mg BID PO  -C/w thiamine to 500  - MVI, folate  -Cleared by PT to walk with walker

## 2018-07-28 VITALS
OXYGEN SATURATION: 100 % | SYSTOLIC BLOOD PRESSURE: 121 MMHG | DIASTOLIC BLOOD PRESSURE: 81 MMHG | WEIGHT: 153.88 LBS | TEMPERATURE: 98 F | HEART RATE: 61 BPM | RESPIRATION RATE: 16 BRPM

## 2018-07-28 PROCEDURE — 99238 HOSP IP/OBS DSCHRG MGMT 30/<: CPT

## 2018-07-28 RX ORDER — FOLIC ACID 0.8 MG
1 TABLET ORAL
Qty: 0 | Refills: 0 | DISCHARGE
Start: 2018-07-28

## 2018-07-28 RX ORDER — THIAMINE MONONITRATE (VIT B1) 100 MG
5 TABLET ORAL
Qty: 0 | Refills: 0 | DISCHARGE
Start: 2018-07-28

## 2018-07-28 RX ORDER — LEVETIRACETAM 250 MG/1
1 TABLET, FILM COATED ORAL
Qty: 14 | Refills: 0
Start: 2018-07-28 | End: 2018-08-03

## 2018-07-28 RX ADMIN — Medication 25 MILLIGRAM(S): at 07:15

## 2018-07-28 RX ADMIN — LEVETIRACETAM 500 MILLIGRAM(S): 250 TABLET, FILM COATED ORAL at 07:16

## 2018-07-28 RX ADMIN — Medication 500 MILLIGRAM(S): at 07:16

## 2018-07-28 RX ADMIN — Medication 1 MILLIGRAM(S): at 11:50

## 2018-07-28 RX ADMIN — Medication 500 MILLIGRAM(S): at 15:43

## 2018-07-28 RX ADMIN — Medication 1 PATCH: at 11:51

## 2018-07-28 RX ADMIN — Medication 20 MILLIGRAM(S): at 11:50

## 2018-07-28 RX ADMIN — Medication 1 TABLET(S): at 11:50

## 2018-07-28 RX ADMIN — Medication 1 PATCH: at 11:50

## 2018-07-28 NOTE — DISCHARGE NOTE ADULT - PATIENT PORTAL LINK FT
You can access the [facility] Patient Portal, offered by [facility], by registering with the following website: [email or web address]

## 2018-07-28 NOTE — DISCHARGE NOTE ADULT - PROVIDER TOKENS
FREE:[LAST:[Clinic],FIRST:[[facility]],PHONE:[(   )    -],FAX:[(   )    -],ADDRESS:[Clinic Staff, [facility]   Address: [address] [place] [address]  Phone: [phone]]]

## 2018-07-28 NOTE — DISCHARGE NOTE ADULT - CARE PLAN
Principal Discharge DX:	Alcohol withdrawal delirium, acute, hyperactive  Goal:	Prevent further alcohol abuse  Assessment and plan of treatment:	You were brought into the hospital by EMS due to public intoxication. While in the hospital you had altered mental status due to withdrawal from alcohol. We treated you with medications to prevent you from hurting yourself while you were withdrawing. You spoke to Psychiatrist who recommended you to seek substance abuse counseling. Please refrain from alcohol use in the future, and see  at [facility] to help you quit using alcohol.  Secondary Diagnosis:	Asthma  Assessment and plan of treatment:	You have a history of asthma. It was well controlled in hospital.  Secondary Diagnosis:	Epilepsy  Assessment and plan of treatment:	You have a history of epilepsy. You told us you were getting Keppra 500 BID from [facility] so we continued you on this course. Please see the Medical Clinic at the [facility] so you can continue to control your epilepsy. Please take all meds as prescribed.  Secondary Diagnosis:	Hypomagnesemia  Assessment and plan of treatment:	You had a low blood level of a electrolyte called Magnesium in your blood, most likely related to your alcohol usage. We corrected it and it has not been a problem since.  Secondary Diagnosis:	Hypophosphatemia  Assessment and plan of treatment:	You had a low blood level of a electrolyte called Phosphate in your blood, most likely related to your alcohol usage. We corrected it and it has not been a problem since.  Secondary Diagnosis:	Suicidal behavior with attempted self-injury  Assessment and plan of treatment:	You were witnessed attempting to choke yourself overnight. This could have been due to alcoholic withdrawal hallucinations or your history of depression/anxiety treated with Paroxetine. You spoke to a Psychiatrist and you declined substance use counseling, but agreed to talking to  at [facility] to coordinate counseling with [place]ym[place] speaking psychiatrist. Please follow yo with Shelter in one week. Please take all meds as perscribed.  Secondary Diagnosis:	Alcohol use disorder  Assessment and plan of treatment:	You have a history of severe alcohol abuse. Please refrain from alcohol use and seek counseling with  and Psychiatrist for help in 1 week

## 2018-07-28 NOTE — DISCHARGE NOTE ADULT - MEDICATION SUMMARY - MEDICATIONS TO TAKE
I will START or STAY ON the medications listed below when I get home from the hospital:    levETIRAcetam 500 mg oral tablet  -- 1 tab(s) by mouth 2 times a day  -- Indication: For Seizures     PARoxetine 20 mg oral tablet  -- 1 tab(s) by mouth once a day  -- Indication: For depression    Multiple Vitamins oral tablet  -- 1 tab(s) by mouth once a day  -- Indication: For Nutrition, metabolism, and development symptoms    folic acid 1 mg oral tablet  -- 1 tab(s) by mouth once a day  -- Indication: For Nutrition, metabolism, and development symptoms    thiamine 100 mg oral tablet  -- 5 tab(s) by mouth   -- Indication: For Nutrition, metabolism, and development symptoms

## 2018-07-28 NOTE — DISCHARGE NOTE ADULT - SECONDARY DIAGNOSIS.
Epilepsy Hypomagnesemia Hypophosphatemia Suicidal behavior with attempted self-injury Alcohol use disorder Asthma

## 2018-07-28 NOTE — DISCHARGE NOTE ADULT - HOSPITAL COURSE
41 yo M with a PMHx of epilepsy (on Keppra), asthma, and alcohol abuse (600-700 mL vodka/day, past intubation in last 3-4 years) who was brought in by EMS to [facility] for alcohol intoxication and was transferred to [facility] for alcohol withdrawal and bradycardia in the 40s. Last drink was 7/21 evening. 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 Due to unsteady gait, Pt was cleared to walk with walker by PT. While on the floors, Pt had one night of agitation, was seen in active suicide attempt where he was seen choking himself with bedsheets requiring restraints, multiple does of ativan, haldol, and one to one constant supervision. Pt was seen by Psychiatry service, where he stated he was upset his clothes where being kept from him. Psych reconsulted when Pt was calmer and cleared and found not to be active suicide risk, no Psych contraindications to discharge.  Pt will follow up with SW at [facility]. He is declining substance abuse program referrals at this time. At this time Pt has completed Librium taper, hemodynamically stable, no Psych or Medical contraindications to discharge.

## 2018-07-28 NOTE — DISCHARGE NOTE ADULT - PLAN OF CARE
You were brought into the hospital by EMS due to public intoxication. While in the hospital you had altered mental status due to withdrawal from alcohol. We treated you with medications to prevent you from hurting yourself while you were withdrawing. You spoke to Psychiatrist who recommended you to seek substance abuse counseling. Please refrain from alcohol use in the future, and see  at [facility] to help you quit using alcohol. You were witnessed attempting to choke yourself overnight. This could have been due to alcoholic withdrawal hallucinations or your history of depression/anxiety treated with Paroxetine. You spoke to a Psychiatrist and you declined substance use counseling, but agreed to talking to  at [facility] to coordinate counseling with [place] Island[place] speaking psychiatrist. Please follow yo with Shelter in one week. Please take all meds as perscribed. You have a history of severe alcohol abuse. Please refrain from alcohol use and seek counseling with  and Psychiatrist for help in 1 week You have a history of asthma. It was well controlled in hospital. Prevent further alcohol abuse You had a low blood level of a electrolyte called Magnesium in your blood, most likely related to your alcohol usage. We corrected it and it has not been a problem since. You have a history of epilepsy. You told us you were getting Keppra 500 BID from [facility] so we continued you on this course. Please see the Medical Clinic at the long term so you can continue to control your epilepsy. Please take all meds as prescribed. You had a low blood level of a electrolyte called Phosphate in your blood, most likely related to your alcohol usage. We corrected it and it has not been a problem since.

## 2018-07-28 NOTE — DISCHARGE NOTE ADULT - CARE PROVIDER_API CALL
Clinic, [facility]  Clinic Staff, [facility]   Address: [address] [place] [id number]  Phone: [phone]  Phone: (   )    -  Fax: (   )    -

## 2018-08-02 DIAGNOSIS — F10.229 ALCOHOL DEPENDENCE WITH INTOXICATION, UNSPECIFIED: ICD-10-CM

## 2018-08-02 DIAGNOSIS — G40.909 EPILEPSY, UNSPECIFIED, NOT INTRACTABLE, WITHOUT STATUS EPILEPTICUS: ICD-10-CM

## 2018-08-02 DIAGNOSIS — J45.909 UNSPECIFIED ASTHMA, UNCOMPLICATED: ICD-10-CM

## 2018-08-02 DIAGNOSIS — R00.1 BRADYCARDIA, UNSPECIFIED: ICD-10-CM

## 2018-08-02 DIAGNOSIS — F10.239 ALCOHOL DEPENDENCE WITH WITHDRAWAL, UNSPECIFIED: ICD-10-CM

## 2018-08-02 DIAGNOSIS — F17.200 NICOTINE DEPENDENCE, UNSPECIFIED, UNCOMPLICATED: ICD-10-CM

## 2018-08-02 DIAGNOSIS — Z59.0 HOMELESSNESS: ICD-10-CM

## 2018-08-02 DIAGNOSIS — E83.42 HYPOMAGNESEMIA: ICD-10-CM

## 2018-08-02 DIAGNOSIS — E83.39 OTHER DISORDERS OF PHOSPHORUS METABOLISM: ICD-10-CM

## 2018-08-02 DIAGNOSIS — G92 TOXIC ENCEPHALOPATHY: ICD-10-CM

## 2018-08-02 DIAGNOSIS — F10.231 ALCOHOL DEPENDENCE WITH WITHDRAWAL DELIRIUM: ICD-10-CM

## 2018-08-02 DIAGNOSIS — F41.9 ANXIETY DISORDER, UNSPECIFIED: ICD-10-CM

## 2018-08-02 DIAGNOSIS — T14.91XA SUICIDE ATTEMPT, INITIAL ENCOUNTER: ICD-10-CM
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
## 2018-08-02 DIAGNOSIS — F32.9 MAJOR DEPRESSIVE DISORDER, SINGLE EPISODE, UNSPECIFIED: ICD-10-CM

## 2018-08-02 SDOH — ECONOMIC STABILITY - HOUSING INSECURITY: HOMELESSNESS: Z59.0

## 2018-11-08 ENCOUNTER — EMERGENCY (EMERGENCY)
Facility: HOSPITAL | Age: 43
LOS: 1 days | Discharge: HOME | End: 2018-11-08
Attending: EMERGENCY MEDICINE | Admitting: EMERGENCY MEDICINE
Payer: MEDICAID

## 2018-11-08 VITALS
OXYGEN SATURATION: 97 % | RESPIRATION RATE: 18 BRPM | DIASTOLIC BLOOD PRESSURE: 86 MMHG | SYSTOLIC BLOOD PRESSURE: 124 MMHG | HEART RATE: 99 BPM | TEMPERATURE: 99 F

## 2018-11-08 DIAGNOSIS — M79.644 PAIN IN RIGHT FINGER(S): ICD-10-CM
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
## 2018-11-08 DIAGNOSIS — Z79.899 OTHER LONG TERM (CURRENT) DRUG THERAPY: ICD-10-CM

## 2018-11-08 DIAGNOSIS — S62.632A DISPLACED FRACTURE OF DISTAL PHALANX OF RIGHT MIDDLE FINGER, INITIAL ENCOUNTER FOR CLOSED FRACTURE: ICD-10-CM

## 2018-11-08 DIAGNOSIS — Z23 ENCOUNTER FOR IMMUNIZATION: ICD-10-CM

## 2018-11-08 DIAGNOSIS — S61.302A UNSPECIFIED OPEN WOUND OF RIGHT MIDDLE FINGER WITH DAMAGE TO NAIL, INITIAL ENCOUNTER: ICD-10-CM
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
## 2018-11-08 DIAGNOSIS — J45.909 UNSPECIFIED ASTHMA, UNCOMPLICATED: ICD-10-CM

## 2018-11-08 PROCEDURE — 73130 X-RAY EXAM OF HAND: CPT | Mod: 26,RT

## 2018-11-08 PROCEDURE — 26750 TREAT FINGER FRACTURE EACH: CPT | Mod: 54

## 2018-11-08 PROCEDURE — 99284 EMERGENCY DEPT VISIT MOD MDM: CPT | Mod: 25

## 2018-11-08 RX ORDER — IBUPROFEN 200 MG
600 TABLET ORAL ONCE
Refills: 0 | Status: COMPLETED | OUTPATIENT
Start: 2018-11-08 | End: 2018-11-08

## 2018-11-08 RX ORDER — CEPHALEXIN 500 MG
500 CAPSULE ORAL ONCE
Refills: 0 | Status: COMPLETED | OUTPATIENT
Start: 2018-11-08 | End: 2018-11-08

## 2018-11-08 RX ORDER — CEPHALEXIN 500 MG
1 CAPSULE ORAL
Qty: 20 | Refills: 0
Start: 2018-11-08 | End: 2018-11-17

## 2018-11-08 RX ORDER — HALOPERIDOL DECANOATE 100 MG/ML
5 INJECTION INTRAMUSCULAR ONCE
Refills: 0 | Status: DISCONTINUED | OUTPATIENT
Start: 2018-11-08 | End: 2018-11-12

## 2018-11-08 RX ORDER — OXYCODONE AND ACETAMINOPHEN 5; 325 MG/1; MG/1
1 TABLET ORAL ONCE
Refills: 0 | Status: DISCONTINUED | OUTPATIENT
Start: 2018-11-08 | End: 2018-11-08

## 2018-11-08 RX ORDER — LEVETIRACETAM 250 MG/1
1000 TABLET, FILM COATED ORAL ONCE
Refills: 0 | Status: COMPLETED | OUTPATIENT
Start: 2018-11-08 | End: 2018-11-08

## 2018-11-08 RX ORDER — TETANUS TOXOID, REDUCED DIPHTHERIA TOXOID AND ACELLULAR PERTUSSIS VACCINE, ADSORBED 5; 2.5; 8; 8; 2.5 [IU]/.5ML; [IU]/.5ML; UG/.5ML; UG/.5ML; UG/.5ML
0.5 SUSPENSION INTRAMUSCULAR ONCE
Refills: 0 | Status: COMPLETED | OUTPATIENT
Start: 2018-11-08 | End: 2018-11-08

## 2018-11-08 RX ORDER — ONDANSETRON 8 MG/1
4 TABLET, FILM COATED ORAL ONCE
Refills: 0 | Status: COMPLETED | OUTPATIENT
Start: 2018-11-08 | End: 2018-11-08

## 2018-11-08 RX ORDER — SODIUM CHLORIDE 9 MG/ML
1000 INJECTION INTRAMUSCULAR; INTRAVENOUS; SUBCUTANEOUS ONCE
Refills: 0 | Status: COMPLETED | OUTPATIENT
Start: 2018-11-08 | End: 2018-11-08

## 2018-11-08 RX ORDER — CEPHALEXIN 500 MG
500 CAPSULE ORAL ONCE
Refills: 0 | Status: DISCONTINUED | OUTPATIENT
Start: 2018-11-08 | End: 2018-11-08

## 2018-11-08 RX ADMIN — OXYCODONE AND ACETAMINOPHEN 1 TABLET(S): 5; 325 TABLET ORAL at 20:57

## 2018-11-08 RX ADMIN — Medication 1 TABLET(S): at 20:57

## 2018-11-08 RX ADMIN — Medication 500 MILLIGRAM(S): at 20:57

## 2018-11-08 RX ADMIN — Medication 50 MILLIGRAM(S): at 19:08

## 2018-11-08 RX ADMIN — TETANUS TOXOID, REDUCED DIPHTHERIA TOXOID AND ACELLULAR PERTUSSIS VACCINE, ADSORBED 0.5 MILLILITER(S): 5; 2.5; 8; 8; 2.5 SUSPENSION INTRAMUSCULAR at 19:09

## 2018-11-08 RX ADMIN — OXYCODONE AND ACETAMINOPHEN 1 TABLET(S): 5; 325 TABLET ORAL at 21:30

## 2018-11-08 RX ADMIN — Medication 1 MILLIGRAM(S): at 21:15

## 2018-11-08 RX ADMIN — LEVETIRACETAM 1000 MILLIGRAM(S): 250 TABLET, FILM COATED ORAL at 21:52

## 2018-11-08 RX ADMIN — OXYCODONE AND ACETAMINOPHEN 1 TABLET(S): 5; 325 TABLET ORAL at 19:08

## 2018-11-08 RX ADMIN — Medication 600 MILLIGRAM(S): at 20:57

## 2018-11-08 NOTE — ED PROVIDER NOTE - MEDICAL DECISION MAKING DETAILS
Will update Tetanus shot, Xray, may or may not contact Plastics. Will update Tetanus shot, Xray, may or may not contact Plastics. see progress notes

## 2018-11-08 NOTE — ED PROVIDER NOTE - PROGRESS NOTE DETAILS
note: pt. requesting d/c , vss, no clinical indication of withdrawal. p. noted to have abnormal gait, however as per pt. had a prior leg/back injury and states that his nl gait. pt. alert tolerating PO. upon d/c pt. was  found outside attempting to cross the street, was believed to be altered an returned to [facility]. no new sx of trauma, will do labs/ct head/sedated re-asses. Labs CT head unremarkable.  Pt rested in ED.  Arouses to voice and ambulating at baseline.  Will discharge to [facility].

## 2018-11-08 NOTE — ED PROVIDER NOTE - OBJECTIVE STATEMENT
42 y.o M with PMhx of ETOH abuse, presents to the ED with c/o right third digit laceration with nail avulsion and bleeding since today. Pt reports slamming door at the shelter he is staying at. Egyptian speaker. Not cooperative. Denies fever, N/V.

## 2018-11-08 NOTE — ED PROVIDER NOTE - PMH
Alcohol abuse    Alcohol abuse    Alcohol use disorder    Asthma    Epilepsy    Medical history unknown    Nonintractable epilepsy without status epilepticus, unspecified epilepsy type    Seizure

## 2018-11-08 NOTE — ED PROVIDER NOTE - DIAGNOSTIC INTERPRETATION
Interpreted by luis manuel cavazos_ x-ray, 3 views  + tuff fracture lt 3 rd finger, no dislocation (joint spaces grossly normal), no Foreign Body noted, soft tissue normal

## 2018-11-08 NOTE — ED ADULT NURSE REASSESSMENT NOTE - NS ED NURSE REASSESS COMMENT FT1
pt continues to be unsteady on his feet. pt states that this is baseline gait due to prior accident. bruising noted to BLE. pt aox4. no slurred speech noted. discussed care with NEHEMIAH Seymour. per PA, pt ok to leave. pt left unit walking. pt refused dc papers and repeat vitals.

## 2018-11-08 NOTE — ED ADULT NURSE REASSESSMENT NOTE - NS ED NURSE REASSESS COMMENT FT1
pt found to be in the middle of the street laying down. pt brought back into ED by security. pt states that he does not remember what happened and states that he is dizzy. notified NEHEMIAH Seymour. pt with fleeting thoughts and requiring a lot of reorienting. pt sedated

## 2018-11-08 NOTE — ED ADULT TRIAGE NOTE - CHIEF COMPLAINT QUOTE
pt biba from shelter after slamming finger in door. actively bleeding. Cambodian speaking. not cooperative in triage. AOB

## 2018-11-08 NOTE — ED ADULT NURSE NOTE - CHIEF COMPLAINT QUOTE
pt biba from shelter after slamming finger in door. actively bleeding. Mauritanian speaking. not cooperative in triage. AOB

## 2018-11-09 VITALS
RESPIRATION RATE: 16 BRPM | SYSTOLIC BLOOD PRESSURE: 110 MMHG | OXYGEN SATURATION: 96 % | DIASTOLIC BLOOD PRESSURE: 78 MMHG | HEART RATE: 80 BPM

## 2018-11-09 LAB
ALBUMIN SERPL ELPH-MCNC: 4 G/DL — SIGNIFICANT CHANGE UP (ref 3.4–5)
ALP SERPL-CCNC: 68 U/L — SIGNIFICANT CHANGE UP (ref 40–120)
ALT FLD-CCNC: 30 U/L — SIGNIFICANT CHANGE UP (ref 12–42)
ANION GAP SERPL CALC-SCNC: 14 MMOL/L — SIGNIFICANT CHANGE UP (ref 9–16)
AST SERPL-CCNC: 26 U/L — SIGNIFICANT CHANGE UP (ref 15–37)
BILIRUB SERPL-MCNC: 0.2 MG/DL — SIGNIFICANT CHANGE UP (ref 0.2–1.2)
BUN SERPL-MCNC: 10 MG/DL — SIGNIFICANT CHANGE UP (ref 7–23)
CALCIUM SERPL-MCNC: 9 MG/DL — SIGNIFICANT CHANGE UP (ref 8.5–10.5)
CHLORIDE SERPL-SCNC: 105 MMOL/L — SIGNIFICANT CHANGE UP (ref 96–108)
CO2 SERPL-SCNC: 24 MMOL/L — SIGNIFICANT CHANGE UP (ref 22–31)
CREAT SERPL-MCNC: 0.73 MG/DL — SIGNIFICANT CHANGE UP (ref 0.5–1.3)
ETHANOL SERPL-MCNC: 224 MG/DL — HIGH
GLUCOSE SERPL-MCNC: 102 MG/DL — HIGH (ref 70–99)
HCT VFR BLD CALC: 38.1 % — LOW (ref 39–50)
HGB BLD-MCNC: 13 G/DL — SIGNIFICANT CHANGE UP (ref 13–17)
MAGNESIUM SERPL-MCNC: 1.8 MG/DL — SIGNIFICANT CHANGE UP (ref 1.6–2.6)
MCHC RBC-ENTMCNC: 30.8 PG — SIGNIFICANT CHANGE UP (ref 27–34)
MCHC RBC-ENTMCNC: 34.1 G/DL — SIGNIFICANT CHANGE UP (ref 32–36)
MCV RBC AUTO: 90.3 FL — SIGNIFICANT CHANGE UP (ref 80–100)
NEUTROPHILS NFR BLD AUTO: 45.5 % — SIGNIFICANT CHANGE UP (ref 43–77)
PLATELET # BLD AUTO: 322 K/UL — SIGNIFICANT CHANGE UP (ref 150–400)
POTASSIUM SERPL-MCNC: 3.2 MMOL/L — LOW (ref 3.5–5.3)
POTASSIUM SERPL-SCNC: 3.2 MMOL/L — LOW (ref 3.5–5.3)
PROT SERPL-MCNC: 7.7 G/DL — SIGNIFICANT CHANGE UP (ref 6.4–8.2)
RBC # BLD: 4.22 M/UL — SIGNIFICANT CHANGE UP (ref 4.2–5.8)
RBC # FLD: 13.5 % — SIGNIFICANT CHANGE UP (ref 10.3–14.5)
SODIUM SERPL-SCNC: 143 MMOL/L — SIGNIFICANT CHANGE UP (ref 132–145)
WBC # BLD: 8.5 K/UL — SIGNIFICANT CHANGE UP (ref 3.8–10.5)
WBC # FLD AUTO: 8.5 K/UL — SIGNIFICANT CHANGE UP (ref 3.8–10.5)

## 2018-11-09 PROCEDURE — 70450 CT HEAD/BRAIN W/O DYE: CPT | Mod: 26

## 2018-11-09 RX ORDER — POTASSIUM CHLORIDE 20 MEQ
40 PACKET (EA) ORAL ONCE
Refills: 0 | Status: COMPLETED | OUTPATIENT
Start: 2018-11-09 | End: 2018-11-09

## 2018-11-09 RX ORDER — HALOPERIDOL DECANOATE 100 MG/ML
5 INJECTION INTRAMUSCULAR ONCE
Refills: 0 | Status: COMPLETED | OUTPATIENT
Start: 2018-11-09 | End: 2018-11-09

## 2018-11-09 RX ADMIN — Medication 2 MILLIGRAM(S): at 00:19

## 2018-11-09 RX ADMIN — ONDANSETRON 4 MILLIGRAM(S): 8 TABLET, FILM COATED ORAL at 00:19

## 2018-11-09 RX ADMIN — SODIUM CHLORIDE 1000 MILLILITER(S): 9 INJECTION INTRAMUSCULAR; INTRAVENOUS; SUBCUTANEOUS at 00:19

## 2018-11-09 RX ADMIN — HALOPERIDOL DECANOATE 5 MILLIGRAM(S): 100 INJECTION INTRAMUSCULAR at 00:19

## 2018-11-09 RX ADMIN — Medication 600 MILLIGRAM(S): at 00:03

## 2018-11-09 RX ADMIN — Medication 40 MILLIEQUIVALENT(S): at 07:34

## 2018-11-18 ENCOUNTER — EMERGENCY (EMERGENCY)
Facility: HOSPITAL | Age: 43
LOS: 1 days | Discharge: HOME | End: 2018-11-18
Admitting: EMERGENCY MEDICINE
Payer: MEDICAID

## 2018-11-18 VITALS
DIASTOLIC BLOOD PRESSURE: 87 MMHG | SYSTOLIC BLOOD PRESSURE: 139 MMHG | TEMPERATURE: 98 F | OXYGEN SATURATION: 96 % | HEART RATE: 110 BPM | RESPIRATION RATE: 20 BRPM

## 2018-11-18 DIAGNOSIS — Z79.2 LONG TERM (CURRENT) USE OF ANTIBIOTICS: ICD-10-CM

## 2018-11-18 DIAGNOSIS — Z79.899 OTHER LONG TERM (CURRENT) DRUG THERAPY: ICD-10-CM

## 2018-11-18 DIAGNOSIS — M79.671 PAIN IN RIGHT FOOT: ICD-10-CM

## 2018-11-18 DIAGNOSIS — S93.601A UNSPECIFIED SPRAIN OF RIGHT FOOT, INITIAL ENCOUNTER: ICD-10-CM
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
## 2018-11-18 DIAGNOSIS — J45.909 UNSPECIFIED ASTHMA, UNCOMPLICATED: ICD-10-CM
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
## 2018-11-18 DIAGNOSIS — F17.200 NICOTINE DEPENDENCE, UNSPECIFIED, UNCOMPLICATED: ICD-10-CM

## 2018-11-18 PROCEDURE — 73610 X-RAY EXAM OF ANKLE: CPT | Mod: 26,RT

## 2018-11-18 PROCEDURE — 73630 X-RAY EXAM OF FOOT: CPT | Mod: 26,RT

## 2018-11-18 PROCEDURE — 99283 EMERGENCY DEPT VISIT LOW MDM: CPT | Mod: 25

## 2018-11-19 VITALS
OXYGEN SATURATION: 97 % | RESPIRATION RATE: 18 BRPM | DIASTOLIC BLOOD PRESSURE: 84 MMHG | SYSTOLIC BLOOD PRESSURE: 144 MMHG | TEMPERATURE: 97 F | HEART RATE: 102 BPM

## 2018-11-19 RX ORDER — IBUPROFEN 200 MG
600 TABLET ORAL ONCE
Refills: 0 | Status: COMPLETED | OUTPATIENT
Start: 2018-11-19 | End: 2018-11-19

## 2018-11-19 RX ADMIN — Medication 600 MILLIGRAM(S): at 01:24

## 2018-11-19 NOTE — ED ADULT NURSE NOTE - NSIMPLEMENTINTERV_GEN_ALL_ED
Implemented All Fall Risk Interventions:  [place] to call system. Call bell, personal items and telephone within reach. Instruct patient to call for assistance. Room bathroom lighting operational. Non-slip footwear when patient is off stretcher. Physically safe environment: no spills, clutter or unnecessary equipment. Stretcher in lowest position, wheels locked, appropriate side rails in place. Provide visual cue, wrist band, yellow gown, etc. Monitor gait and stability. Monitor for mental status changes and reorient to person, place, and time. Review medications for side effects contributing to fall risk. Reinforce activity limits and safety measures with patient and family.

## 2018-11-19 NOTE — ED PROVIDER NOTE - OBJECTIVE STATEMENT
42 year old male with h/o alcohol abuse, asthma presents with right foot pain s/p injury. patient reports twisted his foot while walking down stairs. reports pain and swelling to area. no ankle pain. admits to drinking today. no head injury/trauma. was trying to get to shelter on [address].   Denies head trauma, LOC, break in the skin, paresthesia, numbness, tingling, redness, bleeding, d/c, HA, dizziness, SOB, CP, palpitations, N/V, focal weakness, and malaise.  history provided through pacific interpreters.

## 2018-11-19 NOTE — ED PROVIDER NOTE - PROGRESS NOTE DETAILS
xray reviewed, xray reviewed, no acute fracture seen. spoke to patient with . patient very mad about shelter situation. discussed negative xray. likely sprain. will give walking boot and cane. patient unable to use crutches.  xray reviewed with Dr. Pena as well. patient continues to yell at

## 2018-11-19 NOTE — ED BEHAVIORAL HEALTH NOTE - BEHAVIORAL HEALTH NOTE
Sw was consulted to the ED to assist with patient.  The patient is a 42 year old adult male currently staying at the [facility] at [address] [place] [address]. Per security the patient was transferred to the [facility] at 2:30am in the morning via am ambultee but was turned away as there were no beds in the shelter available and the patient has been in the ER lobby since 3am. This worker spoke with the patient and confirmed that patient was a resident at the [facility] at [address] [place] [address] ([phone]) but that when he went there last night, they in fact did not have any beds and was given the information to go to the [facility] for a bed for the night and to return in the morning to put his name on the list there to get a bed. This worker did confirm with them that there are beds available for today and that the patient can return. A car service was arranged though [employer] car service to transport the patient back as the patients insurance would not cover the service. The  was arranged as a bill back to the facility and the senior staff has been informed about that. The  is set for 1pm and the patient is in agreement with that. Worker made available for any further assistance needed.

## 2018-11-19 NOTE — ED PROVIDER NOTE - PHYSICAL EXAMINATION
VITAL SIGNS: I have reviewed nursing notes and confirm.  CONSTITUTIONAL: Well-developed; well-nourished; in no acute distress.  SKIN: Skin is warm and dry, no acute rash.  HEAD: Normocephalic; atraumatic.  EYES: PERRL, EOM intact; conjunctiva and sclera clear.  ENT: No nasal discharge; airway clear.  NECK: Supple; non tender.  CARD: S1, S2 normal; no murmurs, gallops, or rubs. Regular rate and rhythm.  RESP: No wheezes, rales or rhonchi.  ABD: Normal bowel sounds; soft; non-distended; non-tender;  no palpable or pulsating mass; no hepatosplenomegaly.  EXT: right foot: mild swelling, midfoot, mild TTP, no bruising, no ankle bony tenderness, ROM intact, NVI, no erythema/infection. OTHER: Normal ROM. No clubbing, cyanosis or edema.  NEURO: Alert, oriented. Grossly unremarkable.  PSYCH: Cooperative, appropriate.

## 2018-11-19 NOTE — ED ADULT NURSE REASSESSMENT NOTE - NS ED NURSE REASSESS COMMENT FT1
awaits transport to shelter via medical transport.  Calm and without complaint in chair.
pt communicated with  service for with NP and my self, walking boot placed on pt and assisted with ambulation

## 2018-11-19 NOTE — ED PROVIDER NOTE - DIAGNOSTIC INTERPRETATION
right foot 3V: no fracture seen, no dislocation, no soft tissue swelling, irregularity on posterior tibia   right ankle 3V: no fracture seen, no ST swelling

## 2018-11-19 NOTE — ED PROVIDER NOTE - CARE PROVIDER_API CALL
Mil Jacobo (MD), [facility]  Orthopedics  [address]  [address] [place] [address]  Phone: [phone]  Fax: [phone]

## 2018-12-26 PROCEDURE — 84100 ASSAY OF PHOSPHORUS: CPT

## 2018-12-26 PROCEDURE — 82962 GLUCOSE BLOOD TEST: CPT

## 2018-12-26 PROCEDURE — 71045 X-RAY EXAM CHEST 1 VIEW: CPT

## 2018-12-26 PROCEDURE — 96374 THER/PROPH/DIAG INJ IV PUSH: CPT

## 2018-12-26 PROCEDURE — 97161 PT EVAL LOW COMPLEX 20 MIN: CPT

## 2018-12-26 PROCEDURE — 99285 EMERGENCY DEPT VISIT HI MDM: CPT | Mod: 25

## 2018-12-26 PROCEDURE — 82746 ASSAY OF FOLIC ACID SERUM: CPT

## 2018-12-26 PROCEDURE — 83735 ASSAY OF MAGNESIUM: CPT

## 2018-12-26 PROCEDURE — 85025 COMPLETE CBC W/AUTO DIFF WBC: CPT

## 2018-12-26 PROCEDURE — 97530 THERAPEUTIC ACTIVITIES: CPT

## 2018-12-26 PROCEDURE — 97116 GAIT TRAINING THERAPY: CPT

## 2018-12-26 PROCEDURE — 82607 VITAMIN B-12: CPT

## 2018-12-26 PROCEDURE — 85610 PROTHROMBIN TIME: CPT

## 2018-12-26 PROCEDURE — 80048 BASIC METABOLIC PNL TOTAL CA: CPT

## 2018-12-26 PROCEDURE — 85730 THROMBOPLASTIN TIME PARTIAL: CPT

## 2018-12-26 PROCEDURE — 96361 HYDRATE IV INFUSION ADD-ON: CPT

## 2018-12-26 PROCEDURE — 36415 COLL VENOUS BLD VENIPUNCTURE: CPT

## 2018-12-26 PROCEDURE — 85027 COMPLETE CBC AUTOMATED: CPT

## 2018-12-26 PROCEDURE — G0378: CPT

## 2018-12-26 PROCEDURE — 80053 COMPREHEN METABOLIC PANEL: CPT

## 2018-12-26 PROCEDURE — 93005 ELECTROCARDIOGRAM TRACING: CPT

## 2018-12-26 PROCEDURE — 96375 TX/PRO/DX INJ NEW DRUG ADDON: CPT

## 2018-12-26 PROCEDURE — 80307 DRUG TEST PRSMV CHEM ANLYZR: CPT

## 2018-12-26 PROCEDURE — 70450 CT HEAD/BRAIN W/O DYE: CPT

## 2018-12-26 PROCEDURE — 96376 TX/PRO/DX INJ SAME DRUG ADON: CPT

## 2018-12-30 ENCOUNTER — HOSPITAL ENCOUNTER (INPATIENT)
Dept: HOSPITAL 74 - YASAS | Age: 43
LOS: 4 days | Discharge: OTHER INSTITUTION | End: 2019-01-03
Attending: INTERNAL MEDICINE | Admitting: INTERNAL MEDICINE
Payer: COMMERCIAL

## 2018-12-30 VITALS — BODY MASS INDEX: 23.5 KG/M2

## 2018-12-30 DIAGNOSIS — G40.909: ICD-10-CM

## 2018-12-30 DIAGNOSIS — M89.9: ICD-10-CM

## 2018-12-30 DIAGNOSIS — J45.20: ICD-10-CM

## 2018-12-30 DIAGNOSIS — L30.9: ICD-10-CM

## 2018-12-30 DIAGNOSIS — F10.230: Primary | ICD-10-CM

## 2018-12-30 DIAGNOSIS — B36.9: ICD-10-CM

## 2018-12-30 DIAGNOSIS — F17.213: ICD-10-CM

## 2018-12-30 LAB
APPEARANCE UR: CLEAR
BILIRUB UR STRIP.AUTO-MCNC: NEGATIVE MG/DL
COLOR UR: (no result)
EPITH CASTS URNS QL MICRO: (no result) /HPF
KETONES UR QL STRIP: NEGATIVE
LEUKOCYTE ESTERASE UR QL STRIP.AUTO: NEGATIVE
MUCOUS THREADS URNS QL MICRO: (no result)
NITRITE UR QL STRIP: NEGATIVE
PH UR: 6 [PH] (ref 5–8)
PROT UR QL STRIP: (no result)
PROT UR QL STRIP: NEGATIVE
SP GR UR: 1.02 (ref 1.01–1.03)
UROBILINOGEN UR STRIP-MCNC: 2 MG/DL (ref 0.2–1)

## 2018-12-30 PROCEDURE — HZ2ZZZZ DETOXIFICATION SERVICES FOR SUBSTANCE ABUSE TREATMENT: ICD-10-PCS | Performed by: INTERNAL MEDICINE

## 2018-12-30 RX ADMIN — NICOTINE SCH: 21 PATCH TRANSDERMAL at 12:14

## 2018-12-30 RX ADMIN — IBUPROFEN PRN MG: 400 TABLET, FILM COATED ORAL at 17:24

## 2018-12-30 RX ADMIN — LEVETIRACETAM SCH MG: 500 TABLET, FILM COATED ORAL at 22:04

## 2018-12-30 RX ADMIN — Medication PRN MG: at 22:06

## 2018-12-30 RX ADMIN — Medication SCH MG: at 23:17

## 2018-12-30 RX ADMIN — PANTOPRAZOLE SODIUM SCH MG: 20 TABLET, DELAYED RELEASE ORAL at 12:14

## 2018-12-31 LAB
ALBUMIN SERPL-MCNC: 3.1 G/DL (ref 3.4–5)
ALP SERPL-CCNC: 91 U/L (ref 45–117)
ALT SERPL-CCNC: 66 U/L (ref 13–61)
ANION GAP SERPL CALC-SCNC: 9 MMOL/L (ref 8–16)
AST SERPL-CCNC: 33 U/L (ref 15–37)
BILIRUB SERPL-MCNC: 0.8 MG/DL (ref 0.2–1)
BUN SERPL-MCNC: 7 MG/DL (ref 7–18)
CALCIUM SERPL-MCNC: 8.8 MG/DL (ref 8.5–10.1)
CHLORIDE SERPL-SCNC: 103 MMOL/L (ref 98–107)
CO2 SERPL-SCNC: 27 MMOL/L (ref 21–32)
CREAT SERPL-MCNC: 0.7 MG/DL (ref 0.55–1.3)
DEPRECATED RDW RBC AUTO: 15.2 % (ref 11.9–15.9)
GLUCOSE SERPL-MCNC: 123 MG/DL (ref 74–106)
HCT VFR BLD CALC: 39.8 % (ref 35.4–49)
HGB BLD-MCNC: 12.9 GM/DL (ref 11.7–16.9)
MCH RBC QN AUTO: 29.1 PG (ref 25.7–33.7)
MCHC RBC AUTO-ENTMCNC: 32.5 G/DL (ref 32–35.9)
MCV RBC: 89.4 FL (ref 80–96)
PLATELET # BLD AUTO: 293 K/MM3 (ref 134–434)
PMV BLD: 8 FL (ref 7.5–11.1)
POTASSIUM SERPLBLD-SCNC: 3.6 MMOL/L (ref 3.5–5.1)
PROT SERPL-MCNC: 6.3 G/DL (ref 6.4–8.2)
RBC # BLD AUTO: 4.45 M/MM3 (ref 4–5.6)
SODIUM SERPL-SCNC: 138 MMOL/L (ref 136–145)
WBC # BLD AUTO: 6.3 K/MM3 (ref 4–10)

## 2018-12-31 RX ADMIN — NICOTINE SCH MG: 21 PATCH TRANSDERMAL at 10:15

## 2018-12-31 RX ADMIN — LEVETIRACETAM SCH MG: 500 TABLET, FILM COATED ORAL at 22:21

## 2018-12-31 RX ADMIN — Medication SCH MG: at 22:21

## 2018-12-31 RX ADMIN — Medication PRN MG: at 22:22

## 2018-12-31 RX ADMIN — LEVETIRACETAM SCH MG: 500 TABLET, FILM COATED ORAL at 10:16

## 2018-12-31 RX ADMIN — IBUPROFEN PRN MG: 400 TABLET, FILM COATED ORAL at 16:47

## 2018-12-31 RX ADMIN — Medication SCH TAB: at 10:16

## 2018-12-31 RX ADMIN — PANTOPRAZOLE SODIUM SCH MG: 20 TABLET, DELAYED RELEASE ORAL at 10:17

## 2019-01-01 RX ADMIN — Medication SCH MG: at 22:28

## 2019-01-01 RX ADMIN — CLOTRIMAZOLE AND BETAMETHASONE DIPROPIONATE SCH APPFUL: 10; .5 CREAM TOPICAL at 22:28

## 2019-01-01 RX ADMIN — CLOTRIMAZOLE AND BETAMETHASONE DIPROPIONATE SCH APPFUL: 10; .5 CREAM TOPICAL at 14:37

## 2019-01-01 RX ADMIN — IBUPROFEN PRN MG: 400 TABLET, FILM COATED ORAL at 17:31

## 2019-01-01 RX ADMIN — LEVETIRACETAM SCH MG: 500 TABLET, FILM COATED ORAL at 22:27

## 2019-01-01 RX ADMIN — LEVETIRACETAM SCH MG: 500 TABLET, FILM COATED ORAL at 10:27

## 2019-01-01 RX ADMIN — PANTOPRAZOLE SODIUM SCH MG: 20 TABLET, DELAYED RELEASE ORAL at 10:27

## 2019-01-01 RX ADMIN — Medication SCH TAB: at 10:27

## 2019-01-01 RX ADMIN — NICOTINE SCH MG: 21 PATCH TRANSDERMAL at 10:27

## 2019-01-02 RX ADMIN — CLOTRIMAZOLE AND BETAMETHASONE DIPROPIONATE SCH APPFUL: 10; .5 CREAM TOPICAL at 22:21

## 2019-01-02 RX ADMIN — Medication SCH MG: at 22:21

## 2019-01-02 RX ADMIN — PANTOPRAZOLE SODIUM SCH MG: 20 TABLET, DELAYED RELEASE ORAL at 10:45

## 2019-01-02 RX ADMIN — LEVETIRACETAM SCH MG: 500 TABLET, FILM COATED ORAL at 22:21

## 2019-01-02 RX ADMIN — Medication SCH TAB: at 10:45

## 2019-01-02 RX ADMIN — NICOTINE SCH: 21 PATCH TRANSDERMAL at 10:48

## 2019-01-02 RX ADMIN — CLOTRIMAZOLE AND BETAMETHASONE DIPROPIONATE SCH APPFUL: 10; .5 CREAM TOPICAL at 10:47

## 2019-01-02 RX ADMIN — FLUOXETINE HYDROCHLORIDE SCH MG: 20 CAPSULE ORAL at 10:45

## 2019-01-02 RX ADMIN — LEVETIRACETAM SCH MG: 500 TABLET, FILM COATED ORAL at 10:45

## 2019-01-03 ENCOUNTER — HOSPITAL ENCOUNTER (INPATIENT)
Dept: HOSPITAL 74 - YASAS | Age: 44
LOS: 5 days | Discharge: HOME | DRG: 772 | End: 2019-01-08
Attending: PSYCHIATRY & NEUROLOGY | Admitting: PSYCHIATRY & NEUROLOGY
Payer: COMMERCIAL

## 2019-01-03 VITALS — TEMPERATURE: 96.8 F | DIASTOLIC BLOOD PRESSURE: 82 MMHG | SYSTOLIC BLOOD PRESSURE: 115 MMHG | HEART RATE: 53 BPM

## 2019-01-03 DIAGNOSIS — M89.8X9: ICD-10-CM

## 2019-01-03 DIAGNOSIS — F10.24: ICD-10-CM

## 2019-01-03 DIAGNOSIS — G40.909: ICD-10-CM

## 2019-01-03 DIAGNOSIS — B36.9: ICD-10-CM

## 2019-01-03 DIAGNOSIS — F17.213: ICD-10-CM

## 2019-01-03 DIAGNOSIS — L30.9: ICD-10-CM

## 2019-01-03 DIAGNOSIS — F10.20: Primary | ICD-10-CM

## 2019-01-03 PROCEDURE — HZ42ZZZ GROUP COUNSELING FOR SUBSTANCE ABUSE TREATMENT, COGNITIVE-BEHAVIORAL: ICD-10-PCS | Performed by: PSYCHIATRY & NEUROLOGY

## 2019-01-03 RX ADMIN — Medication SCH TAB: at 11:05

## 2019-01-03 RX ADMIN — CLOTRIMAZOLE AND BETAMETHASONE DIPROPIONATE SCH APPFUL: 10; .5 CREAM TOPICAL at 11:03

## 2019-01-03 RX ADMIN — CLOTRIMAZOLE SCH APPLIC: 1 CREAM TOPICAL at 21:24

## 2019-01-03 RX ADMIN — Medication PRN MG: at 21:24

## 2019-01-03 RX ADMIN — IBUPROFEN PRN MG: 400 TABLET, FILM COATED ORAL at 13:49

## 2019-01-03 RX ADMIN — LEVETIRACETAM SCH MG: 500 TABLET, FILM COATED ORAL at 21:22

## 2019-01-03 RX ADMIN — PANTOPRAZOLE SODIUM SCH MG: 20 TABLET, DELAYED RELEASE ORAL at 11:03

## 2019-01-03 RX ADMIN — FLUOXETINE HYDROCHLORIDE SCH MG: 20 CAPSULE ORAL at 11:03

## 2019-01-03 RX ADMIN — LEVETIRACETAM SCH MG: 500 TABLET, FILM COATED ORAL at 11:03

## 2019-01-03 RX ADMIN — Medication SCH MG: at 21:22

## 2019-01-03 RX ADMIN — NICOTINE SCH MG: 21 PATCH TRANSDERMAL at 11:03

## 2019-01-04 RX ADMIN — HYDROXYZINE PAMOATE SCH MG: 50 CAPSULE ORAL at 17:43

## 2019-01-04 RX ADMIN — IBUPROFEN PRN MG: 400 TABLET, FILM COATED ORAL at 13:41

## 2019-01-04 RX ADMIN — Medication SCH MG: at 21:32

## 2019-01-04 RX ADMIN — LEVETIRACETAM SCH MG: 500 TABLET, FILM COATED ORAL at 10:15

## 2019-01-04 RX ADMIN — NALTREXONE HYDROCHLORIDE SCH MG: 50 TABLET, FILM COATED ORAL at 13:39

## 2019-01-04 RX ADMIN — GABAPENTIN SCH MG: 400 CAPSULE ORAL at 13:39

## 2019-01-04 RX ADMIN — CLOTRIMAZOLE SCH APPLIC: 1 CREAM TOPICAL at 10:15

## 2019-01-04 RX ADMIN — CLOTRIMAZOLE SCH APPLIC: 1 CREAM TOPICAL at 21:34

## 2019-01-04 RX ADMIN — LEVETIRACETAM SCH MG: 500 TABLET, FILM COATED ORAL at 21:32

## 2019-01-04 RX ADMIN — GABAPENTIN SCH MG: 400 CAPSULE ORAL at 21:32

## 2019-01-04 RX ADMIN — CLOTRIMAZOLE SCH: 1 CREAM TOPICAL at 21:33

## 2019-01-04 RX ADMIN — FLUOXETINE HYDROCHLORIDE SCH MG: 20 CAPSULE ORAL at 13:40

## 2019-01-04 RX ADMIN — HYDROXYZINE PAMOATE SCH MG: 50 CAPSULE ORAL at 21:32

## 2019-01-04 RX ADMIN — HYDROXYZINE PAMOATE SCH MG: 50 CAPSULE ORAL at 13:40

## 2019-01-04 RX ADMIN — Medication SCH TAB: at 10:15

## 2019-01-04 RX ADMIN — Medication PRN MG: at 21:32

## 2019-01-05 RX ADMIN — NALTREXONE HYDROCHLORIDE SCH MG: 50 TABLET, FILM COATED ORAL at 09:48

## 2019-01-05 RX ADMIN — GABAPENTIN SCH MG: 400 CAPSULE ORAL at 06:26

## 2019-01-05 RX ADMIN — FLUOXETINE HYDROCHLORIDE SCH MG: 20 CAPSULE ORAL at 09:48

## 2019-01-05 RX ADMIN — LEVETIRACETAM SCH MG: 500 TABLET, FILM COATED ORAL at 09:49

## 2019-01-05 RX ADMIN — HYDROXYZINE PAMOATE SCH MG: 50 CAPSULE ORAL at 13:34

## 2019-01-05 RX ADMIN — HYDROXYZINE PAMOATE SCH MG: 50 CAPSULE ORAL at 21:22

## 2019-01-05 RX ADMIN — CLOTRIMAZOLE SCH APPLIC: 1 CREAM TOPICAL at 09:48

## 2019-01-05 RX ADMIN — Medication SCH TAB: at 09:49

## 2019-01-05 RX ADMIN — HYDROXYZINE PAMOATE SCH MG: 50 CAPSULE ORAL at 13:04

## 2019-01-05 RX ADMIN — GABAPENTIN SCH MG: 400 CAPSULE ORAL at 13:34

## 2019-01-05 RX ADMIN — GABAPENTIN SCH MG: 400 CAPSULE ORAL at 21:22

## 2019-01-05 RX ADMIN — HYDROXYZINE PAMOATE SCH MG: 50 CAPSULE ORAL at 17:09

## 2019-01-05 RX ADMIN — ACETAMINOPHEN PRN MG: 325 TABLET ORAL at 21:24

## 2019-01-05 RX ADMIN — CLOTRIMAZOLE SCH APPLIC: 1 CREAM TOPICAL at 21:24

## 2019-01-05 RX ADMIN — LEVETIRACETAM SCH MG: 500 TABLET, FILM COATED ORAL at 21:22

## 2019-01-05 RX ADMIN — ACETAMINOPHEN PRN MG: 325 TABLET ORAL at 09:49

## 2019-01-05 RX ADMIN — Medication SCH MG: at 21:22

## 2019-01-06 RX ADMIN — CLOTRIMAZOLE SCH APPLIC: 1 CREAM TOPICAL at 09:34

## 2019-01-06 RX ADMIN — HYDROXYZINE PAMOATE SCH MG: 50 CAPSULE ORAL at 14:43

## 2019-01-06 RX ADMIN — NALTREXONE HYDROCHLORIDE SCH MG: 50 TABLET, FILM COATED ORAL at 11:33

## 2019-01-06 RX ADMIN — LEVETIRACETAM SCH MG: 500 TABLET, FILM COATED ORAL at 21:37

## 2019-01-06 RX ADMIN — LEVETIRACETAM SCH MG: 500 TABLET, FILM COATED ORAL at 09:33

## 2019-01-06 RX ADMIN — GABAPENTIN SCH MG: 400 CAPSULE ORAL at 14:43

## 2019-01-06 RX ADMIN — FLUOXETINE HYDROCHLORIDE SCH MG: 20 CAPSULE ORAL at 09:33

## 2019-01-06 RX ADMIN — GABAPENTIN SCH MG: 400 CAPSULE ORAL at 06:47

## 2019-01-06 RX ADMIN — CLOTRIMAZOLE SCH APPLIC: 1 CREAM TOPICAL at 22:08

## 2019-01-06 RX ADMIN — GABAPENTIN SCH MG: 400 CAPSULE ORAL at 21:37

## 2019-01-06 RX ADMIN — Medication SCH TAB: at 09:33

## 2019-01-06 RX ADMIN — IBUPROFEN PRN MG: 400 TABLET, FILM COATED ORAL at 11:44

## 2019-01-06 RX ADMIN — HYDROXYZINE PAMOATE SCH MG: 50 CAPSULE ORAL at 09:32

## 2019-01-06 RX ADMIN — HYDROXYZINE PAMOATE SCH MG: 50 CAPSULE ORAL at 21:37

## 2019-01-06 RX ADMIN — HYDROXYZINE PAMOATE SCH MG: 50 CAPSULE ORAL at 17:13

## 2019-01-06 RX ADMIN — Medication SCH MG: at 21:37

## 2019-01-07 RX ADMIN — Medication SCH MG: at 21:07

## 2019-01-07 RX ADMIN — HYDROCORTISONE SCH APPLIC: 1 CREAM TOPICAL at 21:07

## 2019-01-07 RX ADMIN — GABAPENTIN SCH MG: 400 CAPSULE ORAL at 14:12

## 2019-01-07 RX ADMIN — Medication PRN MG: at 21:08

## 2019-01-07 RX ADMIN — HYDROXYZINE PAMOATE SCH MG: 50 CAPSULE ORAL at 17:01

## 2019-01-07 RX ADMIN — Medication SCH TAB: at 10:25

## 2019-01-07 RX ADMIN — NALTREXONE HYDROCHLORIDE SCH MG: 50 TABLET, FILM COATED ORAL at 10:27

## 2019-01-07 RX ADMIN — HYDROXYZINE PAMOATE SCH MG: 50 CAPSULE ORAL at 10:25

## 2019-01-07 RX ADMIN — GABAPENTIN SCH MG: 400 CAPSULE ORAL at 21:07

## 2019-01-07 RX ADMIN — CLOTRIMAZOLE SCH APPLIC: 1 CREAM TOPICAL at 10:26

## 2019-01-07 RX ADMIN — FLUOXETINE HYDROCHLORIDE SCH MG: 20 CAPSULE ORAL at 10:25

## 2019-01-07 RX ADMIN — GABAPENTIN SCH MG: 400 CAPSULE ORAL at 06:34

## 2019-01-07 RX ADMIN — ACETAMINOPHEN PRN MG: 325 TABLET ORAL at 08:52

## 2019-01-07 RX ADMIN — IBUPROFEN PRN MG: 400 TABLET, FILM COATED ORAL at 16:53

## 2019-01-07 RX ADMIN — HYDROXYZINE PAMOATE SCH MG: 50 CAPSULE ORAL at 14:12

## 2019-01-07 RX ADMIN — LEVETIRACETAM SCH MG: 500 TABLET, FILM COATED ORAL at 10:25

## 2019-01-07 RX ADMIN — LEVETIRACETAM SCH MG: 500 TABLET, FILM COATED ORAL at 21:07

## 2019-01-07 RX ADMIN — HYDROXYZINE PAMOATE SCH MG: 50 CAPSULE ORAL at 21:07

## 2019-01-08 VITALS — SYSTOLIC BLOOD PRESSURE: 125 MMHG | DIASTOLIC BLOOD PRESSURE: 78 MMHG | TEMPERATURE: 98.1 F | HEART RATE: 54 BPM

## 2019-01-08 RX ADMIN — GABAPENTIN SCH MG: 400 CAPSULE ORAL at 14:23

## 2019-01-08 RX ADMIN — NALTREXONE HYDROCHLORIDE SCH MG: 50 TABLET, FILM COATED ORAL at 10:22

## 2019-01-08 RX ADMIN — FLUOXETINE HYDROCHLORIDE SCH MG: 20 CAPSULE ORAL at 10:21

## 2019-01-08 RX ADMIN — Medication SCH TAB: at 10:22

## 2019-01-08 RX ADMIN — HYDROCORTISONE SCH: 1 CREAM TOPICAL at 14:23

## 2019-01-08 RX ADMIN — HYDROCORTISONE SCH APPLIC: 1 CREAM TOPICAL at 06:21

## 2019-01-08 RX ADMIN — GABAPENTIN SCH MG: 400 CAPSULE ORAL at 06:21

## 2019-01-08 RX ADMIN — HYDROXYZINE PAMOATE SCH MG: 50 CAPSULE ORAL at 14:23

## 2019-01-08 RX ADMIN — LEVETIRACETAM SCH MG: 500 TABLET, FILM COATED ORAL at 10:21

## 2019-01-08 RX ADMIN — HYDROXYZINE PAMOATE SCH MG: 50 CAPSULE ORAL at 10:21

## 2019-01-16 ENCOUNTER — EMERGENCY (EMERGENCY)
Facility: HOSPITAL | Age: 44
LOS: 1 days | Discharge: HOME | End: 2019-01-16
Admitting: EMERGENCY MEDICINE
Payer: MEDICAID

## 2019-01-16 VITALS
OXYGEN SATURATION: 97 % | DIASTOLIC BLOOD PRESSURE: 76 MMHG | RESPIRATION RATE: 18 BRPM | TEMPERATURE: 97 F | WEIGHT: 164.91 LBS | SYSTOLIC BLOOD PRESSURE: 112 MMHG | HEART RATE: 82 BPM

## 2019-01-16 DIAGNOSIS — I10 ESSENTIAL (PRIMARY) HYPERTENSION: ICD-10-CM

## 2019-01-16 DIAGNOSIS — F10.129 ALCOHOL ABUSE WITH INTOXICATION, UNSPECIFIED: ICD-10-CM

## 2019-01-16 DIAGNOSIS — R41.82 ALTERED MENTAL STATUS, UNSPECIFIED: ICD-10-CM

## 2019-01-16 DIAGNOSIS — J45.909 UNSPECIFIED ASTHMA, UNCOMPLICATED: ICD-10-CM

## 2019-01-16 DIAGNOSIS — Z79.899 OTHER LONG TERM (CURRENT) DRUG THERAPY: ICD-10-CM

## 2019-01-16 DIAGNOSIS — Z79.2 LONG TERM (CURRENT) USE OF ANTIBIOTICS: ICD-10-CM

## 2019-01-16 PROCEDURE — 99284 EMERGENCY DEPT VISIT MOD MDM: CPT

## 2019-01-16 RX ORDER — OLANZAPINE 15 MG/1
10 TABLET, FILM COATED ORAL ONCE
Refills: 0 | Status: COMPLETED | OUTPATIENT
Start: 2019-01-16 | End: 2019-01-16

## 2019-01-16 RX ADMIN — OLANZAPINE 10 MILLIGRAM(S): 15 TABLET, FILM COATED ORAL at 17:45

## 2019-01-16 NOTE — ED ADULT NURSE NOTE - OBJECTIVE STATEMENT
here for ams, pt admits to drinking vodka- denies drugs- pt is agitated and attempted to hit staff with his cane- pt escorted to room 10 for sedation and for safety precautions. NEHEMIAH Martinez made aware and will monitor closely

## 2019-01-16 NOTE — ED PROVIDER NOTE - PHYSICAL EXAMINATION
Gen - WDWN, +AOB, no acute distress  Skin - warm, dry, intact  HEENT - AT/NC, PERRL, mild conjunctival injection, pupils 3mm b/l, TM intact with no hemotympanium b/l, no facial contusion or periorbital ecchymosis, o/p clear, uvula midline, airway patent, neck supple with no step off or midline tenderness, FROM   CV - S1S2, R/R/R  Resp - respiration non-labored, CTAB, symmetric bs b/l, no r/r/w  GI - NABS, soft, ND, NT, no rebound or guarding, no CVAT b/l  MS - w/w/p, no c/c/e, calves supple and NT  Neuro - Alert and awake, slightly slurred speech, ambulatory steady gait, no focal deficits Gen - WDWN M, +AOB, agitated and abusive to staff, no respiratory distress  Skin - warm, dry, intact  HEENT - AT/NC, PERRL, mild conjunctival injection, pupils 3mm b/l, TM intact with no hemotympanium b/l, no facial contusion or periorbital ecchymosis, o/p clear, uvula midline, airway patent, neck supple with no step off or midline tenderness, FROM   CV - S1S2, rapid rate, regular rhythm   Resp - respiration non-labored, CTAB, symmetric bs b/l, no r/r/w  GI - NABS, soft, ND, NT, no rebound or guarding, no CVAT b/l  MS - w/w/p, no c/c/e, calves supple and NT  Neuro - Alert and awake, agitated and combative, ambulatory with a cane, slurred speech

## 2019-01-16 NOTE — ED PROVIDER NOTE - OBJECTIVE STATEMENT
42 yo M with PMHx of alcoholism, asthma, HTN, epilepsy, BIBA for public intoxication and AMS. Admits to drinking vodka but refused giving other medical information.  No apparent trauma, bleeding, respiratory distress, N/V, pain, focal weakness, urinary/bowel incontinence or tremors noted. Pt is altered and unable to provide clinical information.  Severely agitated at triage and unable to cooperate for rest of exam. Limited ROS due to current agitation.

## 2019-01-16 NOTE — ED PROVIDER NOTE - PROGRESS NOTE DETAILS
The patient is now awake and alert, clinically sober.  Able to walk a straight line.  Repeat exam and neuro/cranial nerve exams normal.  No evidence of head/neck trauma.  Patient denies any pain or other complaints.  Denies cp/sob/ha/abd pain.  Abd soft, lungs clear, heart exam normal.  Dionisio po challenge.  Patient says only used alcohol no other substances.  Denies any assault.  Feels much better and pt feels safe for discharge.  No evidence of intoxication at this time or alcohol withdrawal.  No other complaints on discharge. security had to escort patient out to waiting room

## 2019-01-16 NOTE — ED ADULT NURSE NOTE - NSIMPLEMENTINTERV_GEN_ALL_ED
Implemented All Fall Risk Interventions:  [place] to call system. Call bell, personal items and telephone within reach. Instruct patient to call for assistance. Room bathroom lighting operational. Non-slip footwear when patient is off stretcher. Physically safe environment: no spills, clutter or unnecessary equipment. Stretcher in lowest position, wheels locked, appropriate side rails in place. Provide visual cue, wrist band, yellow gown, etc. Monitor gait and stability. Monitor for mental status changes and reorient to person, place, and time. Review medications for side effects contributing to fall risk. Reinforce activity limits and safety measures with patient and family.

## 2019-01-16 NOTE — ED PROVIDER NOTE - MEDICAL DECISION MAKING DETAILS
pt here with public intox, severely agitated and combative upon arrival, attempted to hit staff with cane and verbally abusive, pt is an imminent threat to self and others due to aggressive/volatile behaviors and failed to respond to various descalation techniques and unable to redirect. Will proceed with chemical sedation and place on close observation, frequent reassessment and pending sobriety, signed out to night team pending reassessment and sobriety

## 2019-01-17 VITALS
OXYGEN SATURATION: 97 % | DIASTOLIC BLOOD PRESSURE: 67 MMHG | RESPIRATION RATE: 16 BRPM | HEART RATE: 89 BPM | SYSTOLIC BLOOD PRESSURE: 102 MMHG

## 2019-01-28 ENCOUNTER — EMERGENCY (EMERGENCY)
Facility: HOSPITAL | Age: 44
LOS: 1 days | Discharge: HOME | End: 2019-01-28
Attending: EMERGENCY MEDICINE | Admitting: EMERGENCY MEDICINE
Payer: MEDICAID

## 2019-01-28 VITALS
HEART RATE: 98 BPM | OXYGEN SATURATION: 99 % | RESPIRATION RATE: 18 BRPM | TEMPERATURE: 98 F | DIASTOLIC BLOOD PRESSURE: 90 MMHG | SYSTOLIC BLOOD PRESSURE: 140 MMHG

## 2019-01-28 DIAGNOSIS — Z79.2 LONG TERM (CURRENT) USE OF ANTIBIOTICS: ICD-10-CM

## 2019-01-28 DIAGNOSIS — J45.909 UNSPECIFIED ASTHMA, UNCOMPLICATED: ICD-10-CM

## 2019-01-28 DIAGNOSIS — R41.82 ALTERED MENTAL STATUS, UNSPECIFIED: ICD-10-CM

## 2019-01-28 DIAGNOSIS — F10.929 ALCOHOL USE, UNSPECIFIED WITH INTOXICATION, UNSPECIFIED: ICD-10-CM
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
## 2019-01-28 DIAGNOSIS — Z79.899 OTHER LONG TERM (CURRENT) DRUG THERAPY: ICD-10-CM

## 2019-01-28 LAB — ETHANOL SERPL-MCNC: 279 MG/DL — HIGH

## 2019-01-28 PROCEDURE — 99220: CPT

## 2019-01-28 RX ORDER — HALOPERIDOL DECANOATE 100 MG/ML
5 INJECTION INTRAMUSCULAR ONCE
Refills: 0 | Status: COMPLETED | OUTPATIENT
Start: 2019-01-28 | End: 2019-01-28

## 2019-01-28 RX ADMIN — HALOPERIDOL DECANOATE 5 MILLIGRAM(S): 100 INJECTION INTRAMUSCULAR at 19:15

## 2019-01-28 RX ADMIN — Medication 2 MILLIGRAM(S): at 20:00

## 2019-01-28 NOTE — ED PROVIDER NOTE - CARE PLAN
Principal Discharge DX:	Altered mental status  Secondary Diagnosis:	Alcohol use disorder  Secondary Diagnosis:	Agitation requiring sedation protocol

## 2019-01-28 NOTE — ED ADULT TRIAGE NOTE - CHIEF COMPLAINT QUOTE
pt found intoxicated at shelter. sent by staff/police called because pt was aggressive. admits to intox

## 2019-01-28 NOTE — ED PROVIDER NOTE - PMH
Alcohol abuse    Alcohol use disorder    Asthma    Epilepsy    Nonintractable epilepsy without status epilepticus, unspecified epilepsy type    Seizure

## 2019-01-28 NOTE — ED CDU PROVIDER INITIAL DAY NOTE - MEDICAL DECISION MAKING DETAILS
Alcohol intoxication, not hyperglycemic, no signs of trauma, will observe for clinical sobriety and reassess.  patient brought by [employer] and EMS in handcuffs, agitated and disoriented, yelling at staff, will order blood alcohol, give lorazepam and haloperidol and place in CDU.

## 2019-01-28 NOTE — ED ADULT NURSE REASSESSMENT NOTE - NS ED NURSE REASSESS COMMENT FT1
Pt is asleep in stretcher, appears in no acute distress. Respirations are even and unlabored, spo2 100% on room air. Blood work collected and sent to lab, results pending. Safety and comfort measures maintained. Will continue to monitor.

## 2019-01-28 NOTE — ED PROVIDER NOTE - NS ED ROS FT
Pt was BIBA for public intoxication and AMS. Pt is agitated and verbally aggressive during HPI. Unreliable historian at this time.

## 2019-01-28 NOTE — ED PROVIDER NOTE - MEDICAL DECISION MAKING DETAILS
Alcohol intoxication, not hyperglycemic, no signs of trauma, will observe for clinical sobriety and reassess. Alcohol intoxication, not hyperglycemic, no signs of trauma, will observe for clinical sobriety and reassess.  patient brought by [employer] and EMS in handcuffs, agitated and disoriented, yelling at staff, will order blood alcohol, give lorazepam and haloperidol and place in CDU.

## 2019-01-28 NOTE — ED PROVIDER NOTE - OBJECTIVE STATEMENT
43 y o male with PMHX of alcoholism, asthma, HTN, and epilepsy, was BIBA for public intoxication and AMS. Pt is agitated and verbally aggressive during HPI. Unreliable historian at this time.

## 2019-01-29 VITALS
RESPIRATION RATE: 18 BRPM | DIASTOLIC BLOOD PRESSURE: 76 MMHG | SYSTOLIC BLOOD PRESSURE: 153 MMHG | TEMPERATURE: 98 F | OXYGEN SATURATION: 99 % | HEART RATE: 82 BPM

## 2019-01-29 PROCEDURE — 99217: CPT

## 2019-01-29 NOTE — ED CDU PROVIDER DISPOSITION NOTE - CLINICAL COURSE
pt here for public intox, severely agitated requiring chemical sedation, monitored in the ED with improved mental status, AFVSS, currently ambulatory with steady gait, tolerating PO without N/V, clear speech, without additional medical complaints noted.  Repeat exam and neuro/cranial nerve exams wnl.  No evidence of head/neck trauma. Pt feels much better and safe for discharge.  No evidence of intoxication at this time or alcohol withdrawal. Medically stable for d/c.

## 2019-09-28 ENCOUNTER — EMERGENCY (EMERGENCY)
Facility: HOSPITAL | Age: 44
LOS: 1 days | Discharge: HOME | End: 2019-09-28
Attending: EMERGENCY MEDICINE | Admitting: EMERGENCY MEDICINE
Payer: MEDICAID

## 2019-09-28 VITALS
RESPIRATION RATE: 18 BRPM | SYSTOLIC BLOOD PRESSURE: 144 MMHG | HEART RATE: 100 BPM | TEMPERATURE: 98 F | DIASTOLIC BLOOD PRESSURE: 90 MMHG | OXYGEN SATURATION: 95 %

## 2019-09-28 LAB
ALBUMIN SERPL ELPH-MCNC: 3.4 G/DL — SIGNIFICANT CHANGE UP (ref 3.4–5)
ALP SERPL-CCNC: 100 U/L — SIGNIFICANT CHANGE UP (ref 40–120)
ALT FLD-CCNC: 39 U/L — SIGNIFICANT CHANGE UP (ref 12–42)
ANION GAP SERPL CALC-SCNC: 9 MMOL/L — SIGNIFICANT CHANGE UP (ref 9–16)
AST SERPL-CCNC: 47 U/L — HIGH (ref 15–37)
BASOPHILS NFR BLD AUTO: 1.2 % — SIGNIFICANT CHANGE UP (ref 0–2)
BILIRUB SERPL-MCNC: 0.3 MG/DL — SIGNIFICANT CHANGE UP (ref 0.2–1.2)
BUN SERPL-MCNC: 6 MG/DL — LOW (ref 7–23)
CALCIUM SERPL-MCNC: 8.4 MG/DL — LOW (ref 8.5–10.5)
CHLORIDE SERPL-SCNC: 104 MMOL/L — SIGNIFICANT CHANGE UP (ref 96–108)
CO2 SERPL-SCNC: 28 MMOL/L — SIGNIFICANT CHANGE UP (ref 22–31)
CREAT SERPL-MCNC: 0.81 MG/DL — SIGNIFICANT CHANGE UP (ref 0.5–1.3)
EOSINOPHIL NFR BLD AUTO: 5.2 % — SIGNIFICANT CHANGE UP (ref 0–6)
ETHANOL SERPL-MCNC: 376 MG/DL — HIGH
GLUCOSE BLDC GLUCOMTR-MCNC: 103 MG/DL — HIGH (ref 70–99)
GLUCOSE SERPL-MCNC: 103 MG/DL — HIGH (ref 70–99)
HCT VFR BLD CALC: 40.7 % — SIGNIFICANT CHANGE UP (ref 39–50)
HGB BLD-MCNC: 14.2 G/DL — SIGNIFICANT CHANGE UP (ref 13–17)
IMM GRANULOCYTES NFR BLD AUTO: 0.2 % — SIGNIFICANT CHANGE UP (ref 0–1.5)
LYMPHOCYTES # BLD AUTO: 40 % — SIGNIFICANT CHANGE UP (ref 13–44)
MAGNESIUM SERPL-MCNC: 1.8 MG/DL — SIGNIFICANT CHANGE UP (ref 1.6–2.6)
MCHC RBC-ENTMCNC: 31.2 PG — SIGNIFICANT CHANGE UP (ref 27–34)
MCHC RBC-ENTMCNC: 34.9 G/DL — SIGNIFICANT CHANGE UP (ref 32–36)
MCV RBC AUTO: 89.5 FL — SIGNIFICANT CHANGE UP (ref 80–100)
MONOCYTES NFR BLD AUTO: 9.1 % — SIGNIFICANT CHANGE UP (ref 2–14)
NEUTROPHILS NFR BLD AUTO: 44.3 % — SIGNIFICANT CHANGE UP (ref 43–77)
PLATELET # BLD AUTO: 345 K/UL — SIGNIFICANT CHANGE UP (ref 150–400)
POTASSIUM SERPL-MCNC: 3.3 MMOL/L — LOW (ref 3.5–5.3)
POTASSIUM SERPL-SCNC: 3.3 MMOL/L — LOW (ref 3.5–5.3)
PROT SERPL-MCNC: 7.3 G/DL — SIGNIFICANT CHANGE UP (ref 6.4–8.2)
RBC # BLD: 4.55 M/UL — SIGNIFICANT CHANGE UP (ref 4.2–5.8)
RBC # FLD: 16.6 % — HIGH (ref 10.3–14.5)
SODIUM SERPL-SCNC: 141 MMOL/L — SIGNIFICANT CHANGE UP (ref 132–145)
WBC # BLD: 6.5 K/UL — SIGNIFICANT CHANGE UP (ref 3.8–10.5)
WBC # FLD AUTO: 6.5 K/UL — SIGNIFICANT CHANGE UP (ref 3.8–10.5)

## 2019-09-28 PROCEDURE — 70450 CT HEAD/BRAIN W/O DYE: CPT | Mod: 26

## 2019-09-28 PROCEDURE — 99285 EMERGENCY DEPT VISIT HI MDM: CPT

## 2019-09-28 PROCEDURE — 70486 CT MAXILLOFACIAL W/O DYE: CPT | Mod: 26

## 2019-09-28 RX ORDER — POTASSIUM CHLORIDE 20 MEQ
40 PACKET (EA) ORAL ONCE
Refills: 0 | Status: COMPLETED | OUTPATIENT
Start: 2019-09-28 | End: 2019-09-28

## 2019-09-28 RX ORDER — LEVETIRACETAM 250 MG/1
1000 TABLET, FILM COATED ORAL ONCE
Refills: 0 | Status: COMPLETED | OUTPATIENT
Start: 2019-09-28 | End: 2019-09-28

## 2019-09-28 RX ORDER — ACETAMINOPHEN 500 MG
650 TABLET ORAL ONCE
Refills: 0 | Status: COMPLETED | OUTPATIENT
Start: 2019-09-28 | End: 2019-09-28

## 2019-09-28 RX ADMIN — LEVETIRACETAM 1000 MILLIGRAM(S): 250 TABLET, FILM COATED ORAL at 20:10

## 2019-09-28 RX ADMIN — Medication 40 MILLIEQUIVALENT(S): at 23:01

## 2019-09-28 RX ADMIN — Medication 2 MILLIGRAM(S): at 20:10

## 2019-09-28 RX ADMIN — Medication 650 MILLIGRAM(S): at 20:43

## 2019-09-28 RX ADMIN — Medication 50 MILLIGRAM(S): at 18:52

## 2019-09-28 NOTE — ED PROVIDER NOTE - OBJECTIVE STATEMENT
43 y o male with PMHX of alcoholism, asthma, HTN, and epilepsy, was BIBA for public intoxication and AMS. States he had a headache and was dizzy, today only had one beer (way less than his usual). Pt states he had a fall yesterday and was seen in a hospital and had a ct scan that showed a facial fx, then discharged. Old chart review shows prior visits for alcohol-related problems. +headache now. no nausea, no vomiting, no chest pain, no sob, no abd pain

## 2019-09-28 NOTE — ED PROVIDER NOTE - CLINICAL SUMMARY MEDICAL DECISION MAKING FREE TEXT BOX
will treat for possibility of seizure (primary) and withdrawal. CTs since there is evidence of trauma. Pt states he had some imaging yesterday and was dx w a facial/orbital fx. Will sign out to night team to follow work up.

## 2019-09-28 NOTE — ED PROVIDER NOTE - PROGRESS NOTE DETAILS
Pt A&Ox3. NAD. Clear speech, baseline gait with no complaints at this time. Walking around ED demanding to be discharged. Will D/C.

## 2019-09-28 NOTE — ED ADULT TRIAGE NOTE - CHIEF COMPLAINT QUOTE
Pt. found sleeping outside by bystander, as per EMS bystander pt. was sleeping on the floor and was "unrepsonsive" so they gave him intranasal narcan. Pt. denies drug use and admits to alcohol used, pt. was found with numerous bottles of alcohol around him. Pt. also with wound above the left eyebrow with steri strips states it was applied yesterday after a fall.

## 2019-09-28 NOTE — ED ADULT NURSE NOTE - OBJECTIVE STATEMENT
states he drank beer today. fall yesterday due to "epilepsy" states he drank 1 beer today. fall yesterday due to "epilepsy"  left periorbital and eyelid bruising, swelling and sutures noted above left eyebrow. bilateral PERRL

## 2019-09-28 NOTE — ED PROVIDER NOTE - PATIENT PORTAL LINK FT
You can access the FollowMyHealth Patient Portal offered by [facility] by registering at the following website: [email or web address]. By joining [facility]’s FollowMyHealth portal, you will also be able to view your health information using other applications (apps) compatible with our system.

## 2019-09-29 VITALS
HEART RATE: 65 BPM | SYSTOLIC BLOOD PRESSURE: 114 MMHG | DIASTOLIC BLOOD PRESSURE: 75 MMHG | RESPIRATION RATE: 16 BRPM | OXYGEN SATURATION: 99 %

## 2019-09-29 NOTE — ED ADULT NURSE REASSESSMENT NOTE - NS ED NURSE REASSESS COMMENT FT1
Patient is sleeping comfortably in stretcher, no distress noted and easily rousable. Safety precautions in place and maintained.
received pt from REBECCA Luis- MD Garcia aware of multiple IV attempts failed. MD reports patient does not need IV at this time. attempting to obtain additional lab tests by [facility]. pt alert and awake. in no acute distress.

## 2019-10-02 ENCOUNTER — EMERGENCY (EMERGENCY)
Facility: HOSPITAL | Age: 44
LOS: 1 days | Discharge: HOME | End: 2019-10-02
Attending: EMERGENCY MEDICINE | Admitting: EMERGENCY MEDICINE
Payer: MEDICAID

## 2019-10-02 VITALS
HEART RATE: 116 BPM | SYSTOLIC BLOOD PRESSURE: 151 MMHG | DIASTOLIC BLOOD PRESSURE: 93 MMHG | TEMPERATURE: 97 F | WEIGHT: 164.91 LBS | RESPIRATION RATE: 18 BRPM | OXYGEN SATURATION: 97 %

## 2019-10-02 DIAGNOSIS — F10.10 ALCOHOL ABUSE, UNCOMPLICATED: ICD-10-CM

## 2019-10-02 DIAGNOSIS — F10.129 ALCOHOL ABUSE WITH INTOXICATION, UNSPECIFIED: ICD-10-CM

## 2019-10-02 DIAGNOSIS — Z76.0 ENCOUNTER FOR ISSUE OF REPEAT PRESCRIPTION: ICD-10-CM

## 2019-10-02 DIAGNOSIS — Z79.899 OTHER LONG TERM (CURRENT) DRUG THERAPY: ICD-10-CM

## 2019-10-02 DIAGNOSIS — S01.112D LACERATION WITHOUT FOREIGN BODY OF LEFT EYELID AND PERIOCULAR AREA, SUBSEQUENT ENCOUNTER: ICD-10-CM
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
## 2019-10-02 PROCEDURE — 99284 EMERGENCY DEPT VISIT MOD MDM: CPT

## 2019-10-02 RX ORDER — LEVETIRACETAM 250 MG/1
1000 TABLET, FILM COATED ORAL ONCE
Refills: 0 | Status: COMPLETED | OUTPATIENT
Start: 2019-10-02 | End: 2019-10-02

## 2019-10-02 RX ADMIN — LEVETIRACETAM 1000 MILLIGRAM(S): 250 TABLET, FILM COATED ORAL at 17:55

## 2019-10-02 NOTE — ED PROVIDER NOTE - CARE PLAN
Principal Discharge DX:	Encounter for removal of sutures  Secondary Diagnosis:	Alcohol intoxication  Secondary Diagnosis:	Encounter for medication refill

## 2019-10-02 NOTE — ED ADULT TRIAGE NOTE - CHIEF COMPLAINT QUOTE
here for AMS (admits to drinking alcohol), suture removal above left eyebrow, headache and requesting dose of keppra

## 2019-10-02 NOTE — ED PROVIDER NOTE - OBJECTIVE STATEMENT
Pt is a 44yo M with a h/o AA and epilepsy, who reports heavy etoh use today.  Denies any trauma, falls, altercations, drug use, N/V, abd pain, or black outs.  Pt presents requesting suture removal to a wound sustained to L eyebrow.  Denies any redness, swelling, discharge, or bleeding to the wound.  tetanus is up to date.  Also requesting his dose of keppra as he just ran out of his szr medication.  Otherwise no other acute complaints.

## 2019-10-02 NOTE — ED PROVIDER NOTE - NSFOLLOWUPINSTRUCTIONS_ED_ALL_ED_FT
PLEASE RETURN TO THE ER IMMEDIATELY FOR ANY REDNESS, SWELLING, SEVERE PAIN, PUS DRAINAGE, FEVER, OR ANY OTHER CONCERNS FOR INFECTION/WORSENING.    PLEASE SEE YOUR NEUROLOGIST WITHIN THE NEXT 1-2 DAYS.     PLEASE GO TO YOUR PHARMACY TO FILL YOUR PRESCRIPTIONS.  PLEASE START TODAY AND USE AS DIRECTED.

## 2019-10-02 NOTE — ED ADULT NURSE NOTE - NSIMPLEMENTINTERV_GEN_ALL_ED
Implemented All Fall Risk Interventions:  [place] to call system. Call bell, personal items and telephone within reach. Instruct patient to call for assistance. Room bathroom lighting operational. Non-slip footwear when patient is off stretcher. Physically safe environment: no spills, clutter or unnecessary equipment. Stretcher in lowest position, wheels locked, appropriate side rails in place. Provide visual cue, wrist band, yellow gown, etc. Monitor gait and stability. Monitor for mental status changes and reorient to person, place, and time. Review medications for side effects contributing to fall risk. Reinforce activity limits and safety measures with patient and family.

## 2019-10-02 NOTE — ED PROVIDER NOTE - PHYSICAL EXAMINATION
VITAL SIGNS: I have reviewed nursing notes and confirm.  CONSTITUTIONAL: Well-developed; well-nourished; in no acute distress.  Smells of alcohol.   SKIN: Skin is warm and dry, no acute rash.  Well healing laceration to L eyebrow.  Sutures intact, no erythema, edema, or discharge.   HEAD: Normocephalic; atraumatic.  EYES: PERRL, EOM intact; conjunctiva and sclera clear.  ENT: No nasal discharge; airway clear.  NECK: Supple; non tender.  CARD: S1, S2 normal; no murmurs, gallops, or rubs. Regular rate and rhythm.  RESP: No wheezes, rales or rhonchi.  ABD: Normal bowel sounds; soft; non-distended; non-tender; no hepatosplenomegaly.  MSK: Normal ROM. No clubbing, cyanosis or edema.  NEURO: Alert, oriented. Grossly unremarkable.  PSYCH: Cooperative, appropriate.

## 2019-10-02 NOTE — ED PROVIDER NOTE - CLINICAL SUMMARY MEDICAL DECISION MAKING FREE TEXT BOX
1. Sutures removed.  No e/o infection at this time.  Wound care discussed.   2. Alcohol intoxication.  Will watch and give food and water.   3. Medication refill.  Will give dose of Keppra now and send refill to his pharmacy.  Strict instructions to f/u with his neurologist.

## 2019-10-02 NOTE — ED ADULT NURSE NOTE - OBJECTIVE STATEMENT
pt. picked up for alcohol intox, ambulating with steady gait pt. requesting Keppra reports he hasn't had it in 4 days

## 2019-10-02 NOTE — ED PROVIDER NOTE - PATIENT PORTAL LINK FT
You can access the FollowMyHealth Patient Portal offered by [facility] by registering at the following website: [email or web address]. By joining [facility]’s FollowMyHealth portal, you will also be able to view your health information using other applications (apps) compatible with our system.

## 2019-10-03 DIAGNOSIS — Z79.899 OTHER LONG TERM (CURRENT) DRUG THERAPY: ICD-10-CM

## 2019-10-03 DIAGNOSIS — F10.239 ALCOHOL DEPENDENCE WITH WITHDRAWAL, UNSPECIFIED: ICD-10-CM

## 2019-10-03 DIAGNOSIS — R41.82 ALTERED MENTAL STATUS, UNSPECIFIED: ICD-10-CM

## 2019-10-28 ENCOUNTER — EMERGENCY (EMERGENCY)
Facility: HOSPITAL | Age: 44
LOS: 1 days | Discharge: HOME | End: 2019-10-28
Attending: EMERGENCY MEDICINE | Admitting: EMERGENCY MEDICINE
Payer: MEDICAID

## 2019-10-28 VITALS
TEMPERATURE: 97 F | OXYGEN SATURATION: 99 % | SYSTOLIC BLOOD PRESSURE: 122 MMHG | HEART RATE: 89 BPM | RESPIRATION RATE: 20 BRPM | DIASTOLIC BLOOD PRESSURE: 84 MMHG

## 2019-10-28 VITALS
DIASTOLIC BLOOD PRESSURE: 87 MMHG | TEMPERATURE: 98 F | RESPIRATION RATE: 18 BRPM | OXYGEN SATURATION: 98 % | HEART RATE: 75 BPM | WEIGHT: 149.91 LBS | SYSTOLIC BLOOD PRESSURE: 123 MMHG

## 2019-10-28 PROCEDURE — 99284 EMERGENCY DEPT VISIT MOD MDM: CPT

## 2019-10-28 RX ORDER — IBUPROFEN 200 MG
600 TABLET ORAL ONCE
Refills: 0 | Status: COMPLETED | OUTPATIENT
Start: 2019-10-28 | End: 2019-10-28

## 2019-10-28 RX ORDER — LEVETIRACETAM 250 MG/1
500 TABLET, FILM COATED ORAL
Refills: 0 | Status: DISCONTINUED | OUTPATIENT
Start: 2019-10-28 | End: 2019-11-04

## 2019-10-28 RX ADMIN — Medication 600 MILLIGRAM(S): at 22:16

## 2019-10-28 RX ADMIN — LEVETIRACETAM 500 MILLIGRAM(S): 250 TABLET, FILM COATED ORAL at 22:16

## 2019-10-28 NOTE — ED PROVIDER NOTE - PATIENT PORTAL LINK FT
You can access the FollowMyHealth Patient Portal offered by [facility] by registering at the following website: [email or web address]. By joining [facility]’s FollowMyHealth portal, you will also be able to view your health information using other applications (apps) compatible with our system.

## 2019-10-28 NOTE — ED PROVIDER NOTE - CLINICAL SUMMARY MEDICAL DECISION MAKING FREE TEXT BOX
ams, with hx of seizure disorder, no head injury. patient endorses possible seizure. will give po dose of keppra, and dc. clinically sober.

## 2019-10-28 NOTE — ED ADULT NURSE NOTE - NSIMPLEMENTINTERV_GEN_ALL_ED
Implemented All Fall with Harm Risk Interventions:  [place] to call system. Call bell, personal items and telephone within reach. Instruct patient to call for assistance. Room bathroom lighting operational. Non-slip footwear when patient is off stretcher. Physically safe environment: no spills, clutter or unnecessary equipment. Stretcher in lowest position, wheels locked, appropriate side rails in place. Provide visual cue, wrist band, yellow gown, etc. Monitor gait and stability. Monitor for mental status changes and reorient to person, place, and time. Review medications for side effects contributing to fall risk. Reinforce activity limits and safety measures with patient and family. Provide visual clues: red socks.

## 2019-10-28 NOTE — ED PROVIDER NOTE - UNABLE TO OBTAIN
patient arrives intoxicated, poor historian Urgent need for Intervention patient arrives intoxicated, poor historian, unable to provide a history or cooperate with physical exam

## 2019-10-28 NOTE — ED PROVIDER NOTE - NS ED ROS FT
patient arrives intoxicated, poor historian patient arrives intoxicated, poor historian, unable to provide a history or cooperate with physical exam

## 2019-10-28 NOTE — ED ADULT NURSE NOTE - OBJECTIVE STATEMENT
Pt is a 43y male brought it by EMS for AMS. As per pt he did not take his keppra today and had a seizure. Pt complaining of pain above right eyebrow and to back of head. Pt admits to drinking vodka today. Safety precautions maintained. Will continue to monitor.

## 2019-11-02 ENCOUNTER — EMERGENCY (EMERGENCY)
Facility: HOSPITAL | Age: 44
LOS: 1 days | Discharge: HOME | End: 2019-11-02
Admitting: EMERGENCY MEDICINE
Payer: MEDICAID

## 2019-11-02 VITALS
RESPIRATION RATE: 18 BRPM | DIASTOLIC BLOOD PRESSURE: 86 MMHG | HEART RATE: 63 BPM | TEMPERATURE: 98 F | SYSTOLIC BLOOD PRESSURE: 127 MMHG | OXYGEN SATURATION: 99 % | WEIGHT: 160.06 LBS

## 2019-11-02 PROCEDURE — 99284 EMERGENCY DEPT VISIT MOD MDM: CPT

## 2019-11-02 RX ORDER — LEVETIRACETAM 250 MG/1
500 TABLET, FILM COATED ORAL
Refills: 0 | Status: DISCONTINUED | OUTPATIENT
Start: 2019-11-02 | End: 2019-11-09

## 2019-11-02 RX ADMIN — LEVETIRACETAM 500 MILLIGRAM(S): 250 TABLET, FILM COATED ORAL at 23:12

## 2019-11-02 NOTE — ED PROVIDER NOTE - PATIENT PORTAL LINK FT
You can access the FollowMyHealth Patient Portal offered by [facility] by registering at the following website: [email or web address]. By joining [facility]’s FollowMyHealth portal, you will also be able to view your health information using other applications (apps) compatible with our system.

## 2019-11-02 NOTE — ED ADULT TRIAGE NOTE - CHIEF COMPLAINT QUOTE
admits to drinking beer- is responsive to voice and touch- presents with no obvious injury or trauma

## 2019-11-02 NOTE — ED PROVIDER NOTE - CLINICAL SUMMARY MEDICAL DECISION MAKING FREE TEXT BOX
Pt A&Ox3. NAD. Calm and cooperative. Clear speech, steady gait with no complaints at this time. Requesting his routine dose of Keppra 500mg before leacing. Will administer Keppra 500mg PO and D/C. Pt A&Ox3. NAD. Calm and cooperative. Clear speech with no complaints at this time. Requesting his routine dose of Keppra 500mg before leacing. Will administer Keppra 500mg PO and D/C.

## 2019-11-03 ENCOUNTER — EMERGENCY (EMERGENCY)
Facility: HOSPITAL | Age: 44
LOS: 1 days | Discharge: HOME | End: 2019-11-03
Admitting: EMERGENCY MEDICINE
Payer: SELF-PAY

## 2019-11-03 VITALS
OXYGEN SATURATION: 98 % | HEART RATE: 96 BPM | DIASTOLIC BLOOD PRESSURE: 97 MMHG | HEIGHT: 68 IN | WEIGHT: 139.99 LBS | TEMPERATURE: 98 F | RESPIRATION RATE: 16 BRPM | SYSTOLIC BLOOD PRESSURE: 132 MMHG

## 2019-11-03 DIAGNOSIS — F10.129 ALCOHOL ABUSE WITH INTOXICATION, UNSPECIFIED: ICD-10-CM

## 2019-11-03 PROCEDURE — 99284 EMERGENCY DEPT VISIT MOD MDM: CPT

## 2019-11-03 PROCEDURE — 99053 MED SERV 10PM-8AM 24 HR FAC: CPT

## 2019-11-03 NOTE — ED ADULT TRIAGE NOTE - CHIEF COMPLAINT QUOTE
BIBA recently discharged in this ED for alcohol intoxication. pt arrived with his wheelchair. Admits to drinking alcohol again. No signs of trauma.

## 2019-11-03 NOTE — ED PROVIDER NOTE - PATIENT PORTAL LINK FT
You can access the FollowMyHealth Patient Portal offered by [facility] by registering at the following website: [email or web address]. By joining [employer]’s FollowMyHealth portal, you will also be able to view your health information using other applications (apps) compatible with our system.

## 2019-11-03 NOTE — ED ADULT NURSE NOTE - NSIMPLEMENTINTERV_GEN_ALL_ED
Implemented All Universal Safety Interventions:  [place] to call system. Call bell, personal items and telephone within reach. Instruct patient to call for assistance. Room bathroom lighting operational. Non-slip footwear when patient is off stretcher. Physically safe environment: no spills, clutter or unnecessary equipment. Stretcher in lowest position, wheels locked, appropriate side rails in place.

## 2019-11-03 NOTE — ED PROVIDER NOTE - OBJECTIVE STATEMENT
42 y/o M with Hx ETOH abuse, recently discharged from here for ETOH intox, now returns via EMS for ETOH intox again. Pt admits to drinking again after leaving here. No complaints at this time. No trauma, vomiting, incontinence.

## 2019-11-09 DIAGNOSIS — R41.82 ALTERED MENTAL STATUS, UNSPECIFIED: ICD-10-CM

## 2019-11-09 DIAGNOSIS — J45.909 UNSPECIFIED ASTHMA, UNCOMPLICATED: ICD-10-CM

## 2019-11-09 DIAGNOSIS — F10.129 ALCOHOL ABUSE WITH INTOXICATION, UNSPECIFIED: ICD-10-CM

## 2019-11-09 DIAGNOSIS — Z79.899 OTHER LONG TERM (CURRENT) DRUG THERAPY: ICD-10-CM

## 2020-03-02 NOTE — DISCHARGE NOTE ADULT - FUNCTIONAL SCREEN CURRENT LEVEL: AMBULATION, MLM
[facility]     My [place] Daily Monitoring        Daily Weight Goal: 321 - 326 lbs       Today's Weight:  327 lbs      Alert: 1 lb over      Diuretic: Spironolactone 25 mg daily & Torsemide 40 mg in am and 20 mg in pm     KCl: None      Plan:  Patient does not want to take extra diuretics. Reinforced CHF diet and fluid education      MHT weights:          MHT weight graph:          Next appt 3/27 with RICCARDO Cantor RN 9:12 AM 03/02/20     (1) assistive equipment/Walker

## 2020-07-30 NOTE — ED PROVIDER NOTE - CROS ED CARDIOVAS ALL NEG
Siderails up x 2  Hourly rounding  Call light in reach  Instructed to call for assist before attempting out of bed.   Remains free from falls and accidental injury at this time   Floor free from obstacles  Bed is locked and in lowest position  Adequate lighting provided  Bed alarm on, [place] star and Stay with Me signs posted negative...

## 2021-10-21 NOTE — H&P ADULT - ASSESSMENT
Pt arrives to ED via EMS c/o left sided abdominal pain x3 days. No n/v/d today. Appears in NAD.   41 yo M with a PMHx of epilepsy (on Keppra), asthma, and alcohol abuse (600-700 mL vodka/day, past intubation) who was brought in by EMS to [facility] for alcohol intoxication and was transferred to [facility] for alcohol withdrawal and bradycardia. Last drink was 7/21 evening.

## 2022-04-19 NOTE — DISCHARGE NOTE ADULT - LAUNCH MEDICATION RECONCILIATION
<-- Click to add NO significant Past Surgical History
no
<<-----Click here for Discharge Medication Review

## 2022-05-28 ENCOUNTER — EMERGENCY (EMERGENCY)
Facility: HOSPITAL | Age: 47
LOS: 1 days | Discharge: HOME | End: 2022-05-28
Attending: EMERGENCY MEDICINE | Admitting: EMERGENCY MEDICINE
Payer: MEDICAID

## 2022-05-28 VITALS
OXYGEN SATURATION: 94 % | DIASTOLIC BLOOD PRESSURE: 86 MMHG | SYSTOLIC BLOOD PRESSURE: 128 MMHG | TEMPERATURE: 99 F | RESPIRATION RATE: 18 BRPM | HEART RATE: 100 BPM | HEIGHT: 68 IN

## 2022-05-28 PROCEDURE — 99284 EMERGENCY DEPT VISIT MOD MDM: CPT

## 2022-05-28 NOTE — ED PROVIDER NOTE - PATIENT PORTAL LINK FT
You can access the FollowMyHealth Patient Portal offered by [facility] by registering at the following website: [email or web address]. By joining [employer]’s FollowMyHealth portal, you will also be able to view your health information using other applications (apps) compatible with our system.

## 2022-05-28 NOTE — ED PROVIDER NOTE - NSICDXPASTMEDICALHX_GEN_ALL_CORE_FT
PAST MEDICAL HISTORY:  Alcohol abuse     Alcohol use disorder     Asthma     Epilepsy     Nonintractable epilepsy without status epilepticus, unspecified epilepsy type     Seizure

## 2022-05-31 DIAGNOSIS — R41.82 ALTERED MENTAL STATUS, UNSPECIFIED: ICD-10-CM

## 2022-05-31 DIAGNOSIS — G40.901 EPILEPSY, UNSPECIFIED, NOT INTRACTABLE, WITH STATUS EPILEPTICUS: ICD-10-CM

## 2022-05-31 DIAGNOSIS — F10.120 ALCOHOL ABUSE WITH INTOXICATION, UNCOMPLICATED: ICD-10-CM

## 2022-05-31 DIAGNOSIS — J45.909 UNSPECIFIED ASTHMA, UNCOMPLICATED: ICD-10-CM

## 2022-07-26 ENCOUNTER — HOSPITAL ENCOUNTER (INPATIENT)
Dept: HOSPITAL 74 - YASAS | Age: 47
LOS: 6 days | Discharge: HOME | End: 2022-08-01
Attending: SURGERY | Admitting: ALLERGY & IMMUNOLOGY
Payer: COMMERCIAL

## 2022-07-26 VITALS — BODY MASS INDEX: 27.4 KG/M2

## 2022-07-26 DIAGNOSIS — G40.909: ICD-10-CM

## 2022-07-26 DIAGNOSIS — J45.20: ICD-10-CM

## 2022-07-26 DIAGNOSIS — F17.213: ICD-10-CM

## 2022-07-26 DIAGNOSIS — R29.2: ICD-10-CM

## 2022-07-26 DIAGNOSIS — F10.230: Primary | ICD-10-CM

## 2022-07-26 DIAGNOSIS — Z99.89: ICD-10-CM

## 2022-07-26 DIAGNOSIS — E72.20: ICD-10-CM

## 2022-07-26 DIAGNOSIS — F10.24: ICD-10-CM

## 2022-07-26 PROCEDURE — U0003 INFECTIOUS AGENT DETECTION BY NUCLEIC ACID (DNA OR RNA); SEVERE ACUTE RESPIRATORY SYNDROME CORONAVIRUS 2 (SARS-COV-2) (CORONAVIRUS DISEASE [COVID-19]), AMPLIFIED PROBE TECHNIQUE, MAKING USE OF HIGH THROUGHPUT TECHNOLOGIES AS DESCRIBED BY CMS-2020-01-R: HCPCS

## 2022-07-26 PROCEDURE — U0005 INFEC AGEN DETEC AMPLI PROBE: HCPCS

## 2022-07-26 PROCEDURE — HZ2ZZZZ DETOXIFICATION SERVICES FOR SUBSTANCE ABUSE TREATMENT: ICD-10-PCS | Performed by: SURGERY

## 2022-07-26 RX ADMIN — HYDROXYZINE PAMOATE SCH MG: 25 CAPSULE ORAL at 19:59

## 2022-07-26 RX ADMIN — Medication SCH TAB: at 20:05

## 2022-07-26 RX ADMIN — HYDROXYZINE PAMOATE SCH MG: 25 CAPSULE ORAL at 22:43

## 2022-07-26 RX ADMIN — Medication SCH MG: at 22:43

## 2022-07-26 RX ADMIN — NICOTINE SCH MG: 21 PATCH TRANSDERMAL at 20:03

## 2022-07-26 RX ADMIN — ACETAMINOPHEN PRN MG: 325 TABLET ORAL at 22:43

## 2022-07-26 RX ADMIN — Medication SCH MG: at 22:42

## 2022-07-27 LAB
ALBUMIN SERPL-MCNC: 3.4 G/DL (ref 3.4–5)
ALP SERPL-CCNC: 83 U/L (ref 45–117)
ALT SERPL-CCNC: 22 U/L (ref 13–61)
ANION GAP SERPL CALC-SCNC: 6 MMOL/L (ref 8–16)
AST SERPL-CCNC: 23 U/L (ref 15–37)
BILIRUB SERPL-MCNC: 0.9 MG/DL (ref 0.2–1)
BUN SERPL-MCNC: 7.8 MG/DL (ref 7–18)
CALCIUM SERPL-MCNC: 9.4 MG/DL (ref 8.5–10.1)
CHLORIDE SERPL-SCNC: 102 MMOL/L (ref 98–107)
CO2 SERPL-SCNC: 32 MMOL/L (ref 21–32)
CREAT SERPL-MCNC: 0.6 MG/DL (ref 0.55–1.3)
DEPRECATED RDW RBC AUTO: 14.5 % (ref 11.9–15.9)
GLUCOSE SERPL-MCNC: 95 MG/DL (ref 74–106)
HCT VFR BLD CALC: 40.5 % (ref 35.4–49)
HGB BLD-MCNC: 13.7 GM/DL (ref 11.7–16.9)
MCH RBC QN AUTO: 31.7 PG (ref 25.7–33.7)
MCHC RBC AUTO-ENTMCNC: 33.8 G/DL (ref 32–35.9)
MCV RBC: 93.9 FL (ref 80–96)
PLATELET # BLD AUTO: 313 10^3/UL (ref 134–434)
PMV BLD: 7.5 FL (ref 7.5–11.1)
PROT SERPL-MCNC: 6.7 G/DL (ref 6.4–8.2)
RBC # BLD AUTO: 4.32 M/MM3 (ref 4–5.6)
SODIUM SERPL-SCNC: 140 MMOL/L (ref 136–145)
WBC # BLD AUTO: 5 K/MM3 (ref 4–10)

## 2022-07-27 RX ADMIN — Medication SCH TAB: at 10:49

## 2022-07-27 RX ADMIN — NICOTINE SCH MG: 21 PATCH TRANSDERMAL at 10:50

## 2022-07-27 RX ADMIN — HYDROXYZINE PAMOATE SCH MG: 25 CAPSULE ORAL at 10:49

## 2022-07-27 RX ADMIN — HYDROXYZINE PAMOATE SCH MG: 25 CAPSULE ORAL at 22:51

## 2022-07-27 RX ADMIN — Medication SCH MG: at 22:50

## 2022-07-27 RX ADMIN — Medication SCH MG: at 22:51

## 2022-07-27 RX ADMIN — METHOCARBAMOL PRN MG: 500 TABLET ORAL at 10:49

## 2022-07-27 RX ADMIN — HYDROXYZINE PAMOATE SCH MG: 25 CAPSULE ORAL at 13:20

## 2022-07-27 RX ADMIN — HYDROXYZINE PAMOATE SCH MG: 25 CAPSULE ORAL at 06:22

## 2022-07-27 RX ADMIN — ACETAMINOPHEN PRN MG: 325 TABLET ORAL at 22:54

## 2022-07-27 RX ADMIN — HYDROXYZINE PAMOATE SCH MG: 25 CAPSULE ORAL at 18:22

## 2022-07-28 RX ADMIN — Medication SCH TAB: at 10:53

## 2022-07-28 RX ADMIN — HYDROXYZINE PAMOATE SCH: 25 CAPSULE ORAL at 13:32

## 2022-07-28 RX ADMIN — NICOTINE SCH MG: 21 PATCH TRANSDERMAL at 10:54

## 2022-07-28 RX ADMIN — HYDROXYZINE PAMOATE SCH MG: 25 CAPSULE ORAL at 21:27

## 2022-07-28 RX ADMIN — HYDROXYZINE PAMOATE SCH MG: 25 CAPSULE ORAL at 07:03

## 2022-07-28 RX ADMIN — HYDROXYZINE PAMOATE SCH MG: 25 CAPSULE ORAL at 18:11

## 2022-07-28 RX ADMIN — HYDROXYZINE PAMOATE SCH MG: 25 CAPSULE ORAL at 10:53

## 2022-07-28 RX ADMIN — Medication SCH MG: at 21:27

## 2022-07-28 RX ADMIN — Medication SCH MG: at 21:28

## 2022-07-28 RX ADMIN — METHOCARBAMOL PRN MG: 500 TABLET ORAL at 21:27

## 2022-07-28 RX ADMIN — METHOCARBAMOL PRN MG: 500 TABLET ORAL at 10:53

## 2022-07-29 RX ADMIN — Medication SCH TAB: at 10:43

## 2022-07-29 RX ADMIN — LACTULOSE SCH GM: 20 SOLUTION ORAL at 10:44

## 2022-07-29 RX ADMIN — LACTULOSE SCH GM: 20 SOLUTION ORAL at 13:18

## 2022-07-29 RX ADMIN — HYDROXYZINE PAMOATE SCH MG: 25 CAPSULE ORAL at 10:43

## 2022-07-29 RX ADMIN — LACTULOSE SCH GM: 20 SOLUTION ORAL at 19:44

## 2022-07-29 RX ADMIN — Medication SCH MG: at 22:45

## 2022-07-29 RX ADMIN — NICOTINE SCH MG: 21 PATCH TRANSDERMAL at 10:44

## 2022-07-29 RX ADMIN — LACTULOSE SCH GM: 20 SOLUTION ORAL at 22:46

## 2022-07-29 RX ADMIN — HYDROXYZINE PAMOATE SCH MG: 25 CAPSULE ORAL at 06:33

## 2022-07-29 RX ADMIN — HYDROXYZINE PAMOATE SCH MG: 25 CAPSULE ORAL at 13:16

## 2022-07-30 RX ADMIN — LACTULOSE SCH GM: 20 SOLUTION ORAL at 14:34

## 2022-07-30 RX ADMIN — LACTULOSE SCH GM: 20 SOLUTION ORAL at 22:30

## 2022-07-30 RX ADMIN — HYDROXYZINE PAMOATE SCH MG: 25 CAPSULE ORAL at 05:51

## 2022-07-30 RX ADMIN — NICOTINE SCH MG: 21 PATCH TRANSDERMAL at 10:18

## 2022-07-30 RX ADMIN — Medication SCH TAB: at 10:18

## 2022-07-30 RX ADMIN — Medication SCH MG: at 22:28

## 2022-07-30 RX ADMIN — LACTULOSE SCH GM: 20 SOLUTION ORAL at 10:20

## 2022-07-30 RX ADMIN — HYDROXYZINE PAMOATE SCH MG: 25 CAPSULE ORAL at 10:18

## 2022-07-30 RX ADMIN — Medication SCH MG: at 22:29

## 2022-07-30 RX ADMIN — HYDROXYZINE PAMOATE SCH: 25 CAPSULE ORAL at 18:43

## 2022-07-30 RX ADMIN — LACTULOSE SCH GM: 20 SOLUTION ORAL at 18:01

## 2022-07-30 RX ADMIN — HYDROXYZINE PAMOATE SCH MG: 25 CAPSULE ORAL at 14:34

## 2022-07-30 RX ADMIN — HYDROXYZINE PAMOATE SCH: 25 CAPSULE ORAL at 00:55

## 2022-07-30 RX ADMIN — HYDROXYZINE PAMOATE SCH: 25 CAPSULE ORAL at 22:28

## 2022-07-31 RX ADMIN — LACTULOSE SCH GM: 20 SOLUTION ORAL at 22:09

## 2022-07-31 RX ADMIN — LACTULOSE SCH GM: 20 SOLUTION ORAL at 18:17

## 2022-07-31 RX ADMIN — HYDROXYZINE PAMOATE SCH MG: 25 CAPSULE ORAL at 11:36

## 2022-07-31 RX ADMIN — NICOTINE SCH MG: 21 PATCH TRANSDERMAL at 11:36

## 2022-07-31 RX ADMIN — Medication SCH MG: at 22:10

## 2022-07-31 RX ADMIN — LACTULOSE SCH GM: 20 SOLUTION ORAL at 14:30

## 2022-07-31 RX ADMIN — Medication SCH MG: at 22:09

## 2022-07-31 RX ADMIN — Medication SCH TAB: at 11:35

## 2022-07-31 RX ADMIN — HYDROXYZINE PAMOATE SCH: 25 CAPSULE ORAL at 22:10

## 2022-07-31 RX ADMIN — HYDROXYZINE PAMOATE SCH MG: 25 CAPSULE ORAL at 14:30

## 2022-07-31 RX ADMIN — HYDROXYZINE PAMOATE SCH MG: 25 CAPSULE ORAL at 06:17

## 2022-07-31 RX ADMIN — HYDROXYZINE PAMOATE SCH: 25 CAPSULE ORAL at 18:19

## 2022-07-31 RX ADMIN — LACTULOSE SCH GM: 20 SOLUTION ORAL at 11:37

## 2022-08-01 VITALS
HEART RATE: 76 BPM | TEMPERATURE: 98.1 F | RESPIRATION RATE: 16 BRPM | DIASTOLIC BLOOD PRESSURE: 79 MMHG | SYSTOLIC BLOOD PRESSURE: 123 MMHG

## 2022-08-01 RX ADMIN — LACTULOSE SCH GM: 20 SOLUTION ORAL at 11:06

## 2022-08-01 RX ADMIN — NICOTINE SCH: 21 PATCH TRANSDERMAL at 11:07

## 2022-08-01 RX ADMIN — HYDROXYZINE PAMOATE SCH: 25 CAPSULE ORAL at 11:07

## 2022-08-01 RX ADMIN — Medication SCH TAB: at 11:06

## 2022-08-01 RX ADMIN — HYDROXYZINE PAMOATE SCH MG: 25 CAPSULE ORAL at 06:03

## 2022-08-26 ENCOUNTER — EMERGENCY (EMERGENCY)
Facility: HOSPITAL | Age: 47
LOS: 1 days | Discharge: HOME | End: 2022-08-26
Attending: STUDENT IN AN ORGANIZED HEALTH CARE EDUCATION/TRAINING PROGRAM | Admitting: STUDENT IN AN ORGANIZED HEALTH CARE EDUCATION/TRAINING PROGRAM
Payer: COMMERCIAL

## 2022-08-26 VITALS
SYSTOLIC BLOOD PRESSURE: 115 MMHG | RESPIRATION RATE: 17 BRPM | HEART RATE: 87 BPM | DIASTOLIC BLOOD PRESSURE: 79 MMHG | OXYGEN SATURATION: 99 %

## 2022-08-26 VITALS
OXYGEN SATURATION: 96 % | TEMPERATURE: 98 F | RESPIRATION RATE: 18 BRPM | SYSTOLIC BLOOD PRESSURE: 109 MMHG | DIASTOLIC BLOOD PRESSURE: 76 MMHG | HEIGHT: 68 IN | HEART RATE: 94 BPM

## 2022-08-26 LAB
BASOPHILS # BLD AUTO: 0.13 K/UL — SIGNIFICANT CHANGE UP (ref 0–0.2)
BASOPHILS NFR BLD AUTO: 1.3 % — SIGNIFICANT CHANGE UP (ref 0–2)
EOSINOPHIL # BLD AUTO: 0.39 K/UL — SIGNIFICANT CHANGE UP (ref 0–0.5)
EOSINOPHIL NFR BLD AUTO: 3.9 % — SIGNIFICANT CHANGE UP (ref 0–6)
HCT VFR BLD CALC: 47 % — SIGNIFICANT CHANGE UP (ref 39–50)
HGB BLD-MCNC: 15.9 G/DL — SIGNIFICANT CHANGE UP (ref 13–17)
IMM GRANULOCYTES NFR BLD AUTO: 0.5 % — SIGNIFICANT CHANGE UP (ref 0–1.5)
LYMPHOCYTES # BLD AUTO: 3.48 K/UL — HIGH (ref 1–3.3)
LYMPHOCYTES # BLD AUTO: 34.5 % — SIGNIFICANT CHANGE UP (ref 13–44)
MCHC RBC-ENTMCNC: 31.7 PG — SIGNIFICANT CHANGE UP (ref 27–34)
MCHC RBC-ENTMCNC: 33.8 GM/DL — SIGNIFICANT CHANGE UP (ref 32–36)
MCV RBC AUTO: 93.6 FL — SIGNIFICANT CHANGE UP (ref 80–100)
MONOCYTES # BLD AUTO: 0.64 K/UL — SIGNIFICANT CHANGE UP (ref 0–0.9)
MONOCYTES NFR BLD AUTO: 6.3 % — SIGNIFICANT CHANGE UP (ref 2–14)
NEUTROPHILS # BLD AUTO: 5.41 K/UL — SIGNIFICANT CHANGE UP (ref 1.8–7.4)
NEUTROPHILS NFR BLD AUTO: 53.5 % — SIGNIFICANT CHANGE UP (ref 43–77)
NRBC # BLD: 0 /100 WBCS — SIGNIFICANT CHANGE UP (ref 0–0)
PLATELET # BLD AUTO: 418 K/UL — HIGH (ref 150–400)
RBC # BLD: 5.02 M/UL — SIGNIFICANT CHANGE UP (ref 4.2–5.8)
RBC # FLD: 13.6 % — SIGNIFICANT CHANGE UP (ref 10.3–14.5)
WBC # BLD: 10.1 K/UL — SIGNIFICANT CHANGE UP (ref 3.8–10.5)
WBC # FLD AUTO: 10.1 K/UL — SIGNIFICANT CHANGE UP (ref 3.8–10.5)

## 2022-08-26 PROCEDURE — 99285 EMERGENCY DEPT VISIT HI MDM: CPT

## 2022-08-26 RX ORDER — SODIUM CHLORIDE 9 MG/ML
1000 INJECTION INTRAMUSCULAR; INTRAVENOUS; SUBCUTANEOUS ONCE
Refills: 0 | Status: COMPLETED | OUTPATIENT
Start: 2022-08-26 | End: 2022-08-26

## 2022-08-26 RX ADMIN — SODIUM CHLORIDE 1000 MILLILITER(S): 9 INJECTION INTRAMUSCULAR; INTRAVENOUS; SUBCUTANEOUS at 23:33

## 2022-08-26 NOTE — ED ADULT NURSE NOTE - OBJECTIVE STATEMENT
Patient alert and orientedx3, presenting to ed s/p unwitnessed fall and states he hit his head, denies loc or blood thinners. Patient states he drank 1 liter of vodka. Patient has no facial droop present, moving all four extremities with equal force, no tremors noted, denies dizziness/numbness/tingling. No vomiting noted. Patient calm/cooperative. b/l equal chest expansion noted with each breath.

## 2022-08-26 NOTE — ED ADULT NURSE NOTE - CHIEF COMPLAINT QUOTE
s/p fall unwitnessed with head pain/injury and right knee abrasion as per pt he had  loc denies blood thinner, [place] [address], pt admits he had 1 liter of vodka this afternoon, pt hx of epilepsy  on keppra and asthma, pt speaks [place], we used  [facility] # [id number]

## 2022-08-26 NOTE — ED ADULT TRIAGE NOTE - CHIEF COMPLAINT QUOTE
s/p fall unwitnessed with head pain/injury and right knee abrasion as per pt he had  loc denies blood thinner, EMS [place] [address], pt admits he had 1 liter of vodka this afternoon, pt hx of epilepsy  on keppra and asthma, pt speaks [place], we used  [facility] # [id number] s/p fall unwitnessed with head pain/injury and right knee abrasion as per pt he had  loc denies blood thinner, EMS found [place] bus [place] [address], pt admits he had 1 liter of vodka this afternoon, pt hx of epilepsy  on Keppra and asthma, pt speaks [place]ian, we used  [facility] # [id number]

## 2022-08-27 ENCOUNTER — EMERGENCY (EMERGENCY)
Facility: HOSPITAL | Age: 47
LOS: 1 days | Discharge: HOME | End: 2022-08-27
Attending: EMERGENCY MEDICINE | Admitting: EMERGENCY MEDICINE
Payer: COMMERCIAL

## 2022-08-27 VITALS
SYSTOLIC BLOOD PRESSURE: 108 MMHG | TEMPERATURE: 98 F | DIASTOLIC BLOOD PRESSURE: 74 MMHG | HEIGHT: 68 IN | RESPIRATION RATE: 16 BRPM | HEART RATE: 68 BPM | OXYGEN SATURATION: 97 %

## 2022-08-27 LAB
ALBUMIN SERPL ELPH-MCNC: 4.3 G/DL — SIGNIFICANT CHANGE UP (ref 3.3–5)
ALBUMIN SERPL ELPH-MCNC: 4.5 G/DL — SIGNIFICANT CHANGE UP (ref 3.3–5)
ALP SERPL-CCNC: 46 U/L — SIGNIFICANT CHANGE UP (ref 40–120)
ALP SERPL-CCNC: 48 U/L — SIGNIFICANT CHANGE UP (ref 40–120)
ALT FLD-CCNC: SIGNIFICANT CHANGE UP (ref 10–45)
ALT FLD-CCNC: SIGNIFICANT CHANGE UP (ref 10–45)
ANION GAP SERPL CALC-SCNC: 12 MMOL/L — SIGNIFICANT CHANGE UP (ref 5–17)
ANION GAP SERPL CALC-SCNC: 6 MMOL/L — SIGNIFICANT CHANGE UP (ref 5–17)
AST SERPL-CCNC: SIGNIFICANT CHANGE UP (ref 10–40)
AST SERPL-CCNC: SIGNIFICANT CHANGE UP U/L (ref 10–40)
BILIRUB SERPL-MCNC: 0.2 MG/DL — SIGNIFICANT CHANGE UP (ref 0.2–1.2)
BILIRUB SERPL-MCNC: 0.2 MG/DL — SIGNIFICANT CHANGE UP (ref 0.2–1.2)
BUN SERPL-MCNC: 11 MG/DL — SIGNIFICANT CHANGE UP (ref 7–23)
BUN SERPL-MCNC: 12 MG/DL — SIGNIFICANT CHANGE UP (ref 7–23)
CALCIUM SERPL-MCNC: 8.5 MG/DL — SIGNIFICANT CHANGE UP (ref 8.4–10.5)
CALCIUM SERPL-MCNC: 8.8 MG/DL — SIGNIFICANT CHANGE UP (ref 8.4–10.5)
CHLORIDE SERPL-SCNC: 103 MMOL/L — SIGNIFICANT CHANGE UP (ref 96–108)
CHLORIDE SERPL-SCNC: 99 MMOL/L — SIGNIFICANT CHANGE UP (ref 96–108)
CO2 SERPL-SCNC: 21 MMOL/L — LOW (ref 22–31)
CO2 SERPL-SCNC: 27 MMOL/L — SIGNIFICANT CHANGE UP (ref 22–31)
CREAT SERPL-MCNC: 0.5 MG/DL — SIGNIFICANT CHANGE UP (ref 0.5–1.3)
CREAT SERPL-MCNC: 0.52 MG/DL — SIGNIFICANT CHANGE UP (ref 0.5–1.3)
EGFR: 126 ML/MIN/1.73M2 — SIGNIFICANT CHANGE UP
EGFR: 127 ML/MIN/1.73M2 — SIGNIFICANT CHANGE UP
ETHANOL SERPL-MCNC: 382 MG/DL — HIGH (ref 0–10)
GLUCOSE SERPL-MCNC: 102 MG/DL — HIGH (ref 70–99)
GLUCOSE SERPL-MCNC: 95 MG/DL — SIGNIFICANT CHANGE UP (ref 70–99)
LIDOCAIN IGE QN: 50 U/L — SIGNIFICANT CHANGE UP (ref 7–60)
POTASSIUM SERPL-MCNC: SIGNIFICANT CHANGE UP (ref 3.5–5.3)
POTASSIUM SERPL-MCNC: SIGNIFICANT CHANGE UP MMOL/L (ref 3.5–5.3)
POTASSIUM SERPL-SCNC: SIGNIFICANT CHANGE UP (ref 3.5–5.3)
POTASSIUM SERPL-SCNC: SIGNIFICANT CHANGE UP MMOL/L (ref 3.5–5.3)
PROT SERPL-MCNC: 8.6 G/DL — HIGH (ref 6–8.3)
PROT SERPL-MCNC: 9.2 G/DL — HIGH (ref 6–8.3)
SODIUM SERPL-SCNC: 132 MMOL/L — LOW (ref 135–145)
SODIUM SERPL-SCNC: 136 MMOL/L — SIGNIFICANT CHANGE UP (ref 135–145)

## 2022-08-27 PROCEDURE — 36415 COLL VENOUS BLD VENIPUNCTURE: CPT

## 2022-08-27 PROCEDURE — 83690 ASSAY OF LIPASE: CPT

## 2022-08-27 PROCEDURE — 82962 GLUCOSE BLOOD TEST: CPT

## 2022-08-27 PROCEDURE — 96374 THER/PROPH/DIAG INJ IV PUSH: CPT

## 2022-08-27 PROCEDURE — 80307 DRUG TEST PRSMV CHEM ANLYZR: CPT

## 2022-08-27 PROCEDURE — 80053 COMPREHEN METABOLIC PANEL: CPT

## 2022-08-27 PROCEDURE — 99285 EMERGENCY DEPT VISIT HI MDM: CPT | Mod: 25

## 2022-08-27 PROCEDURE — 99283 EMERGENCY DEPT VISIT LOW MDM: CPT

## 2022-08-27 PROCEDURE — 99285 EMERGENCY DEPT VISIT HI MDM: CPT

## 2022-08-27 PROCEDURE — 85025 COMPLETE CBC W/AUTO DIFF WBC: CPT

## 2022-08-27 RX ORDER — FAMOTIDINE 10 MG/ML
20 INJECTION INTRAVENOUS ONCE
Refills: 0 | Status: COMPLETED | OUTPATIENT
Start: 2022-08-27 | End: 2022-08-27

## 2022-08-27 RX ADMIN — FAMOTIDINE 20 MILLIGRAM(S): 10 INJECTION INTRAVENOUS at 01:50

## 2022-08-27 NOTE — ED PROVIDER NOTE - NSFOLLOWUPINSTRUCTIONS_ED_ALL_ED_FT
Abuse of Alcohol    WHAT YOU NEED TO KNOW:    What is alcohol abuse? Alcohol abuse means you drink more than the recommended daily or weekly limits. You may be drinking alcohol regularly or drinking large amounts in a short period of time (binge drinking). You continue to drink even though it causes legal, work, or relationship problems.    What do I need to know about recommended alcohol limits? One drink is defined as 12 ounces (oz) of beer, 5 oz of wine, or 1.5 oz of liquor such as whiskey.  •Men 21 to 64 years should limit alcohol to 2 drinks a day. Do not have more than 4 drinks in 1 day or more than 14 in 1 week.      •All women, and men 65 or older should limit alcohol to 1 drink in a day. Do not have more than 3 drinks in 1 day or more than 7 in 1 week. Do not drink alcohol if you are pregnant.      What are the signs and symptoms of alcohol abuse?   •Loss of interest in activities, work, and school      •Hiding alcohol, or drinking in private      •Depression, or guilt about drinking      •Constant thoughts about alcohol      •Drinking in the morning to relieve the effects of a hangover      •Not being able to control the amount you drink      •Restlessness, or erratic and violent behavior      What health problems can alcohol abuse cause?   •Cancer in your liver, pancreas, stomach, colon, kidney, or breast      •Stroke or a heart attack      •Liver, kidney, or lung disease      •Blackouts, memory loss, brain damage, or dementia      •Diabetes, immune system problems, or thiamine (vitamin B1) deficiency      •Problems for you and your baby if you drink while pregnant      How is alcohol abuse treated? Treatment can help you understand the reasons you abuse alcohol. Counselors and therapists provide you with support and help you find ways to cope instead of drinking. You may need inpatient treatment to provide a controlled environment. You may need outpatient treatment after your inpatient treatment is complete.  •Detoxification (detox) is a program used to flush alcohol from your body. During detox, medicines are given to help prevent withdrawal symptoms when you stop drinking alcohol.      •Brief intervention therapy helps you think about your alcohol use differently. A healthcare provider helps you set goals to decrease the amount of alcohol you drink. Therapy may continue after you leave the hospital.      •Vitamin supplements such as B1 may be needed. Alcohol can make it hard for your body to absorb enough vitamin B1. You may be given vitamin B1 if you have low levels. It is also given to prevent brain damage from alcohol use.      What can I do to manage my alcohol use?   •Work with healthcare providers on goals to drink less. This can help prevent health problems. For example, you may start by planning your weekly alcohol use. It will be easier to have fewer drinks if you plan ahead.      •Have food when you drink alcohol. Food will prevent alcohol from getting into your system too quickly. Eat before you have your first alcohol drink.      •Time your drinks carefully. Have no more than 1 drink in an hour. Have a liquid such as water, coffee, or a soft drink between alcohol drinks.      •Do not drive if you have had alcohol. Plan ahead so you have a safe ride home. Make sure someone who has not been drinking can help you get home safely. Plan to use a taxi or other ride service, if needed.      •Do not drink alcohol if you are taking medicine. Alcohol is dangerous when you combine it with certain medicines, such as acetaminophen or blood pressure medicine. Talk to your healthcare provider about all the medicines you currently take.      Where can I find support and more information?   •Alcoholics Anonymous  Web Address: http://www.aa.org      •Substance Abuse and Mental Health Services Administration  [address]  [place] [address]  Web Address: http://www.[facility] Services[facility].gov        Call your local emergency number (911 in the ), or have someone call if:   •You have sudden chest pain or trouble breathing.      •You want to harm yourself or others.      •You have a seizure.      When should I seek care immediately?   •You cannot stop vomiting or you vomit blood.          When should I call my doctor?   •You have hallucinations (you see or hear things that are not real).      •You have questions or concerns about your condition or care.      CARE AGREEMENT:    You have the right to help plan your care. Learn about your health condition and how it may be treated. Discuss treatment options with your healthcare providers to decide what care you want to receive. You always have the right to refuse treatment.

## 2022-08-27 NOTE — ED PROVIDER NOTE - OBJECTIVE STATEMENT
45 y/o  M d/c from the ED several minutes ago after presented to the ED s/p intox and fall. Patient had unwitnessed fall and reports he hit his head. No LOC or blood thinners. Endorses drinking 1L of vodka today. He otherwise has no acute complaints. No cp, sob, n/v, vision changes. Pt was on hospital grounds lying on the floor.

## 2022-08-27 NOTE — ED PROVIDER NOTE - PATIENT PORTAL LINK FT
You can access the FollowMyHealth Patient Portal offered by [facility] by registering at the following website: [email or web address]. By joining [employer]’s FollowMyHealth portal, you will also be able to view your health information using other applications (apps) compatible with our system.

## 2022-08-27 NOTE — ED PROVIDER NOTE - PHYSICAL EXAMINATION
GENERAL: sleeping but arouseable   HEENT: NC/AT, moist mucous membranes  LUNGS: CTAB, no wheezes or crackles   CARDIAC: RRR, no m/r/g  ABDOMEN: Soft, normal BS, mild epigastric tenderness, non distended, no rebound, no guarding  BACK: No midline spinal tenderness, no CVA tenderness  EXT: No edema, no calf tenderness, 2+ DP pulses bilaterally, no deformities.  NEURO: A&Ox3. Moving all extremities.  SKIN: Warm and dry. No rash.  PSYCH: appears intoxicated

## 2022-08-27 NOTE — ED ADULT NURSE REASSESSMENT NOTE - NS ED NURSE REASSESS COMMENT FT1
patient refused blood work despite being educated on its importance. Patient refusing ct scan despite being educated on its importance. ivl removed. Patient appears in no distress. Safety precautions maintained.

## 2022-08-27 NOTE — ED PROVIDER NOTE - OBJECTIVE STATEMENT
45 y/o M presenting to the ED s/p intox and fall. Patient had unwitnessed fall and reports he hit his head. No LOC or blood thinners. Endorses drinking 1L of vodka today. He otherwise has no acute complaints. No cp, sob, n/v, vision changes.

## 2022-08-27 NOTE — ED PROVIDER NOTE - CLINICAL SUMMARY MEDICAL DECISION MAKING FREE TEXT BOX
45 y/o M presenting to the ED with acute intox s/p fall. Vitals stable. He appears intoxicated but is oriented on exam. In setting of head injury will obtain CTH to evaluate for acute process. WIll check labs as patient with some epigastric discomfort on exam. Reassess.

## 2022-08-27 NOTE — ED PROVIDER NOTE - CLINICAL SUMMARY MEDICAL DECISION MAKING FREE TEXT BOX
Pt with acute ETOH intoxication physically and verbally abusive in the ED.  Able to verbally de-escalate with pt. Pt given water, he urinated in a urinal and is currently sleeping. Awaiting sobriety prior to d/c.

## 2022-08-27 NOTE — ED PROVIDER NOTE - IV ALTEPLASE EXCL ABS HIDDEN
[FreeTextEntry1] : Mr. RHODES is a 24 year old male with a history of ?childhood asthma, GERD, pilonidal disease, non-smoker who now comes to the office for an initial pulmonary evaluation for cough and SOB.\par \par His shortness of breath is multifactorial due to:\par -poor mechanics of breathing \par -out of shape / overweight\par -pulmonary disease\par   -mild asthmatic condition\par   -allergies\par   -LPR/GERD\par \par \par Problem 1: Mild asthma \par -complete full PFTs\par - add Breo Ellipta 200 at 1 inhalation QHS QD\par - add Ventolin 2 puffs Q6H, pre-exercise\par - Asthma is believed to be caused by inherited (genetic) and environmental factor, but its exact cause is unknown. Asthma may be triggered by allergens, lung infections, or irritants in the air. Asthma triggers are different for each person \par \par Problem 2: Chronic cough\par -risk factors include mild asthma, allergies, LPR/GERD\par - Any cough greater than three weeks duration-differential diagnosis includes-asthma, upper airway cough syndrome, post nasal drip syndrome, gastroesophageal reflux, laryngopharyngeal reflux, cardiac disease (congestive heart failure, medicines, effects, etc), medication effects (b-blockers, ace inhibitors, ARBs, glaucoma meds, etc.), smoking, infectious, multifactorial, etc. \par \par Problem 3: allergies\par  -get Blood work to include: asthma panel, food IgE panel, IgE level, eosinophil level, vitamin D level  \par - add Olopatadine 0.6% 1 sniff BID\par - Environmental measures for allergies were encouraged including mattress and pillow covers, air purifier, and environmental controls.\par \par Problem 4: LPR/GERD\par - add Pepcid 40 mg QHS \par -Rule of 2s: avoid eating too much, eating too late, eating too spicy, eating two hours before bed.\par -Things to avoid including overeating, spicy foods, tight clothing, eating within three hours of bed, this list is not all inclusive. \par -For treatment of reflux, possible options discussed including diet control, H2 blockers, PPIs, as well as coating motility agents discussed as treatment options. Timing of meals and proximity of last meal to sleep were discussed. If symptoms persist, a formal gastrointestinal evaluation is needed. \par  \par Problem 5: Primary snoring \par - unlikely sleep apnea \par - Good sleep hygiene was encouraged including avoiding watching television an hour before bed, keeping caffeine at a low, avoiding reading, television, or anything, in bed, no drinking any liquids three hours before bedtime, and only getting into bed when tired and ready for sleep. \par \par Problem 6: poor breathing mechanics\par -Proper breathing techniques were reviewed with an emphasis of exhalation. Patient instructed to breath in for 1 second and out for four seconds. Patient was encouraged to not talk while walking. \par \par Problem 7: out of shape / overweight\par -Weight loss, exercise, and diet control were discussed and are highly encouraged. Treatment options were given such as, aqua therapy, and contacting a nutritionist. Recommended to use the elliptical, stationary bike, less use of treadmill. Mindful eating was explained to the patient Obesity is associated with worsening asthma, shortness of breath, and potential for cardiac disease, diabetes, and other underlying medical conditions\par \par Problem 8: health maintenance \par -recommended yearly flu shot after October 15\par -recommended strep pneumonia vaccines: Prevnar-13 vaccine, followed by Pneumo vaccine 23 one year following\par -recommended early intervention for Upper Respiratory Infections (URIs)\par -recommended regular osteoporosis evaluations\par -recommended early dermatological evaluations\par -recommended after the age of 50 to the age of 70, colonoscopy every 5 years\par \par F/U in 6-8 weeks.\par He is encouraged to call with any changes, concerns, or questions\par \par \par   show

## 2022-08-27 NOTE — ED ADULT NURSE NOTE - OBJECTIVE STATEMENT
Patient is 46 y.o male client seen, evaluated earlier, refused blood work and ct scan. Discharged however refused to leave instead laid in front of [facility] ER. Pt noted to be verbally abusive. Will cont to monitor.

## 2022-08-27 NOTE — ED PROVIDER NOTE - PROGRESS NOTE DETAILS
Discussed with pt d/c to home. pt agreeable. pt A and oriented. Able to give address to home and st wanting transport to home as he is having difficulty ambulating. transportation aranged.

## 2022-08-27 NOTE — ED ADULT TRIAGE NOTE - CHIEF COMPLAINT QUOTE
Patient seen, evaluated earlier, refused blood work and ct scan- dc'd however refused to leave instead layed in front of [facility] ER.

## 2022-08-27 NOTE — ED PROVIDER NOTE - PROGRESS NOTE DETAILS
Patient refusing head CT at this time, will reassess when more sober Patient ambulatory in the ED, A&OX4, refusing blood work or head CT, states feels fine and wants to leave.

## 2022-08-27 NOTE — ED PROVIDER NOTE - PATIENT PORTAL LINK FT
You can access the FollowMyHealth Patient Portal offered by [facility] by registering at the following website: [email or web address]. By joining [employer]’s FollowMyHealth portal, you will also be able to view your health information using other applications (apps) compatible with our system.

## 2022-08-27 NOTE — ED ADULT NURSE NOTE - CHIEF COMPLAINT QUOTE
Patient seen, evaluated earlier, refused blood work and ct scan- dc'd however refused to leave instead layed in front of [facility] ER.

## 2022-08-27 NOTE — ED ADULT NURSE NOTE - PRO INTERPRETER NEED 2
[facility]  [address]  [facility] [address]  Phone:  [phone]  Fax:  [phone]                                    Alessia Stone   MRN: [id number]    Department:  [facility]   Date of Visit:  7/28/2020           After Visit Summary Signature Page    I have received my discharge instructions, and my questions have been answered. I have discussed any challenges I see with this plan with the nurse or doctor.    ..........................................................................................................................................  Patient/Patient Representative Signature      ..........................................................................................................................................  Patient Representative Print Name and Relationship to Patient    ..................................................               ................................................  Date                                   Time    ..........................................................................................................................................  Reviewed by Signature/Title    ...................................................              ..............................................  Date                                               Time          22EPIC Rev 08/18       
English

## 2022-08-29 DIAGNOSIS — F10.929 ALCOHOL USE, UNSPECIFIED WITH INTOXICATION, UNSPECIFIED: ICD-10-CM

## 2022-08-29 DIAGNOSIS — G40.909 EPILEPSY, UNSPECIFIED, NOT INTRACTABLE, WITHOUT STATUS EPILEPTICUS: ICD-10-CM
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
## 2022-08-29 DIAGNOSIS — F10.129 ALCOHOL ABUSE WITH INTOXICATION, UNSPECIFIED: ICD-10-CM
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
## 2022-08-29 DIAGNOSIS — J45.909 UNSPECIFIED ASTHMA, UNCOMPLICATED: ICD-10-CM

## 2022-08-29 DIAGNOSIS — R10.816 EPIGASTRIC ABDOMINAL TENDERNESS: ICD-10-CM

## 2022-09-16 ENCOUNTER — EMERGENCY (EMERGENCY)
Facility: HOSPITAL | Age: 47
LOS: 1 days | Discharge: HOME | End: 2022-09-16
Attending: STUDENT IN AN ORGANIZED HEALTH CARE EDUCATION/TRAINING PROGRAM | Admitting: STUDENT IN AN ORGANIZED HEALTH CARE EDUCATION/TRAINING PROGRAM
Payer: COMMERCIAL

## 2022-09-16 VITALS
OXYGEN SATURATION: 97 % | TEMPERATURE: 98 F | DIASTOLIC BLOOD PRESSURE: 87 MMHG | WEIGHT: 169.98 LBS | HEART RATE: 102 BPM | SYSTOLIC BLOOD PRESSURE: 141 MMHG | HEIGHT: 68 IN | RESPIRATION RATE: 19 BRPM

## 2022-09-16 VITALS
HEART RATE: 87 BPM | RESPIRATION RATE: 18 BRPM | SYSTOLIC BLOOD PRESSURE: 110 MMHG | DIASTOLIC BLOOD PRESSURE: 76 MMHG | OXYGEN SATURATION: 98 %

## 2022-09-16 DIAGNOSIS — Z59.01 SHELTERED HOMELESSNESS: ICD-10-CM

## 2022-09-16 DIAGNOSIS — J45.909 UNSPECIFIED ASTHMA, UNCOMPLICATED: ICD-10-CM

## 2022-09-16 DIAGNOSIS — R56.9 UNSPECIFIED CONVULSIONS: ICD-10-CM
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
## 2022-09-16 DIAGNOSIS — G89.29 OTHER CHRONIC PAIN: ICD-10-CM

## 2022-09-16 DIAGNOSIS — F10.129 ALCOHOL ABUSE WITH INTOXICATION, UNSPECIFIED: ICD-10-CM

## 2022-09-16 DIAGNOSIS — S00.81XA ABRASION OF OTHER PART OF HEAD, INITIAL ENCOUNTER: ICD-10-CM
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
## 2022-09-16 DIAGNOSIS — M25.562 PAIN IN LEFT KNEE: ICD-10-CM

## 2022-09-16 DIAGNOSIS — G40.909 EPILEPSY, UNSPECIFIED, NOT INTRACTABLE, WITHOUT STATUS EPILEPTICUS: ICD-10-CM

## 2022-09-16 DIAGNOSIS — Z20.822 CONTACT WITH AND (SUSPECTED) EXPOSURE TO COVID-19: ICD-10-CM

## 2022-09-16 LAB
ALBUMIN SERPL ELPH-MCNC: 4.8 G/DL — SIGNIFICANT CHANGE UP (ref 3.3–5)
ALP SERPL-CCNC: 82 U/L — SIGNIFICANT CHANGE UP (ref 40–120)
ALT FLD-CCNC: 19 U/L — SIGNIFICANT CHANGE UP (ref 10–45)
ANION GAP SERPL CALC-SCNC: 12 MMOL/L — SIGNIFICANT CHANGE UP (ref 5–17)
AST SERPL-CCNC: 25 U/L — SIGNIFICANT CHANGE UP (ref 10–40)
BASOPHILS # BLD AUTO: 0.04 K/UL — SIGNIFICANT CHANGE UP (ref 0–0.2)
BASOPHILS NFR BLD AUTO: 0.7 % — SIGNIFICANT CHANGE UP (ref 0–2)
BILIRUB SERPL-MCNC: 0.2 MG/DL — SIGNIFICANT CHANGE UP (ref 0.2–1.2)
BUN SERPL-MCNC: 6 MG/DL — LOW (ref 7–23)
CALCIUM SERPL-MCNC: 9 MG/DL — SIGNIFICANT CHANGE UP (ref 8.4–10.5)
CHLORIDE SERPL-SCNC: 102 MMOL/L — SIGNIFICANT CHANGE UP (ref 96–108)
CO2 SERPL-SCNC: 29 MMOL/L — SIGNIFICANT CHANGE UP (ref 22–31)
CREAT SERPL-MCNC: 0.6 MG/DL — SIGNIFICANT CHANGE UP (ref 0.5–1.3)
EGFR: 121 ML/MIN/1.73M2 — SIGNIFICANT CHANGE UP
EOSINOPHIL # BLD AUTO: 0.17 K/UL — SIGNIFICANT CHANGE UP (ref 0–0.5)
EOSINOPHIL NFR BLD AUTO: 3.2 % — SIGNIFICANT CHANGE UP (ref 0–6)
ETHANOL SERPL-MCNC: 349 MG/DL — HIGH (ref 0–10)
GLUCOSE SERPL-MCNC: 84 MG/DL — SIGNIFICANT CHANGE UP (ref 70–99)
HCT VFR BLD CALC: 38.9 % — LOW (ref 39–50)
HGB BLD-MCNC: 13.3 G/DL — SIGNIFICANT CHANGE UP (ref 13–17)
IMM GRANULOCYTES NFR BLD AUTO: 0.2 % — SIGNIFICANT CHANGE UP (ref 0–0.9)
LACTATE SERPL-SCNC: 2.4 MMOL/L — HIGH (ref 0.5–2)
LYMPHOCYTES # BLD AUTO: 2.03 K/UL — SIGNIFICANT CHANGE UP (ref 1–3.3)
LYMPHOCYTES # BLD AUTO: 37.7 % — SIGNIFICANT CHANGE UP (ref 13–44)
MAGNESIUM SERPL-MCNC: 2.1 MG/DL — SIGNIFICANT CHANGE UP (ref 1.6–2.6)
MCHC RBC-ENTMCNC: 31.6 PG — SIGNIFICANT CHANGE UP (ref 27–34)
MCHC RBC-ENTMCNC: 34.2 GM/DL — SIGNIFICANT CHANGE UP (ref 32–36)
MCV RBC AUTO: 92.4 FL — SIGNIFICANT CHANGE UP (ref 80–100)
MONOCYTES # BLD AUTO: 0.51 K/UL — SIGNIFICANT CHANGE UP (ref 0–0.9)
MONOCYTES NFR BLD AUTO: 9.5 % — SIGNIFICANT CHANGE UP (ref 2–14)
NEUTROPHILS # BLD AUTO: 2.63 K/UL — SIGNIFICANT CHANGE UP (ref 1.8–7.4)
NEUTROPHILS NFR BLD AUTO: 48.7 % — SIGNIFICANT CHANGE UP (ref 43–77)
NRBC # BLD: 0 /100 WBCS — SIGNIFICANT CHANGE UP (ref 0–0)
PHOSPHATE SERPL-MCNC: 3.2 MG/DL — SIGNIFICANT CHANGE UP (ref 2.5–4.5)
PLATELET # BLD AUTO: 247 K/UL — SIGNIFICANT CHANGE UP (ref 150–400)
POTASSIUM SERPL-MCNC: 3.7 MMOL/L — SIGNIFICANT CHANGE UP (ref 3.5–5.3)
POTASSIUM SERPL-SCNC: 3.7 MMOL/L — SIGNIFICANT CHANGE UP (ref 3.5–5.3)
PROT SERPL-MCNC: 7.4 G/DL — SIGNIFICANT CHANGE UP (ref 6–8.3)
RBC # BLD: 4.21 M/UL — SIGNIFICANT CHANGE UP (ref 4.2–5.8)
RBC # FLD: 14.2 % — SIGNIFICANT CHANGE UP (ref 10.3–14.5)
SARS-COV-2 RNA SPEC QL NAA+PROBE: NEGATIVE — SIGNIFICANT CHANGE UP
SODIUM SERPL-SCNC: 143 MMOL/L — SIGNIFICANT CHANGE UP (ref 135–145)
WBC # BLD: 5.39 K/UL — SIGNIFICANT CHANGE UP (ref 3.8–10.5)
WBC # FLD AUTO: 5.39 K/UL — SIGNIFICANT CHANGE UP (ref 3.8–10.5)

## 2022-09-16 PROCEDURE — 80053 COMPREHEN METABOLIC PANEL: CPT

## 2022-09-16 PROCEDURE — 85025 COMPLETE CBC W/AUTO DIFF WBC: CPT

## 2022-09-16 PROCEDURE — 87635 SARS-COV-2 COVID-19 AMP PRB: CPT

## 2022-09-16 PROCEDURE — 99285 EMERGENCY DEPT VISIT HI MDM: CPT

## 2022-09-16 PROCEDURE — 84100 ASSAY OF PHOSPHORUS: CPT

## 2022-09-16 PROCEDURE — 80307 DRUG TEST PRSMV CHEM ANLYZR: CPT

## 2022-09-16 PROCEDURE — 36415 COLL VENOUS BLD VENIPUNCTURE: CPT

## 2022-09-16 PROCEDURE — 99284 EMERGENCY DEPT VISIT MOD MDM: CPT

## 2022-09-16 PROCEDURE — 83605 ASSAY OF LACTIC ACID: CPT

## 2022-09-16 PROCEDURE — 82962 GLUCOSE BLOOD TEST: CPT

## 2022-09-16 PROCEDURE — 83735 ASSAY OF MAGNESIUM: CPT

## 2022-09-16 RX ORDER — DIAZEPAM 5 MG
5 TABLET ORAL ONCE
Refills: 0 | Status: DISCONTINUED | OUTPATIENT
Start: 2022-09-16 | End: 2022-09-16

## 2022-09-16 RX ORDER — LEVETIRACETAM 250 MG/1
500 TABLET, FILM COATED ORAL ONCE
Refills: 0 | Status: COMPLETED | OUTPATIENT
Start: 2022-09-16 | End: 2022-09-16

## 2022-09-16 RX ORDER — ACETAMINOPHEN 500 MG
650 TABLET ORAL ONCE
Refills: 0 | Status: COMPLETED | OUTPATIENT
Start: 2022-09-16 | End: 2022-09-16

## 2022-09-16 RX ADMIN — LEVETIRACETAM 500 MILLIGRAM(S): 250 TABLET, FILM COATED ORAL at 11:00

## 2022-09-16 RX ADMIN — Medication 50 MILLIGRAM(S): at 10:52

## 2022-09-16 RX ADMIN — Medication 650 MILLIGRAM(S): at 11:05

## 2022-09-16 RX ADMIN — Medication 650 MILLIGRAM(S): at 10:52

## 2022-09-16 RX ADMIN — Medication 5 MILLIGRAM(S): at 15:33

## 2022-09-16 SDOH — ECONOMIC STABILITY - HOUSING INSECURITY: SHELTERED HOMELESSNESS: Z59.01

## 2022-09-16 NOTE — ED ADULT NURSE NOTE - OBJECTIVE STATEMENT
pt BIBA from the street , alert and oriented x 3 , pt states that he drank alcohol last night , hx alcohol abuse , pt unsteady on ambulation , pain behind left knee

## 2022-09-16 NOTE — ED PROVIDER NOTE - NSFOLLOWUPINSTRUCTIONS_ED_ALL_ED_FT
Follow up with your primary medical doctor as soon as possible.    Return to the emergency department if your symptoms worsen or if you develop new symptoms.    Alcohol Abuse    Alcohol intoxication occurs when the amount of alcohol that a person has consumed impairs his or her ability to mentally and physically function. Chronic alcohol consumption can also lead to a variety of health issues including neurological disease, stomach disease, heart disease, liver disease, etc. Do not drive after drinking alcohol. Drinking enough alcohol to end up in an Emergency Room suggests you may have an alcohol abuse problem. Seek help at a drug addiction center.    SEEK IMMEDIATE MEDICAL CARE IF YOU HAVE ANY OF THE FOLLOWING SYMPTOMS: seizures, vomiting blood, blood in your stool, lightheadedness/dizziness, or becoming shaky to tremulous when you stop drinking. Follow up with your primary medical doctor as soon as possible.  TAKE YOUR KEPPRA AS PRESCRIBED.  Return to the emergency department if your symptoms worsen or if you develop new symptoms.    Alcohol Abuse    Alcohol intoxication occurs when the amount of alcohol that a person has consumed impairs his or her ability to mentally and physically function. Chronic alcohol consumption can also lead to a variety of health issues including neurological disease, stomach disease, heart disease, liver disease, etc. Do not drive after drinking alcohol. Drinking enough alcohol to end up in an Emergency Room suggests you may have an alcohol abuse problem. Seek help at a drug addiction center.    SEEK IMMEDIATE MEDICAL CARE IF YOU HAVE ANY OF THE FOLLOWING SYMPTOMS: seizures, vomiting blood, blood in your stool, lightheadedness/dizziness, or becoming shaky to tremulous when you stop drinking.

## 2022-09-16 NOTE — ED PROVIDER NOTE - PHYSICAL EXAMINATION
CONSTITUTIONAL: Non-toxic; in no apparent distress  HEAD: Normocephalic; abrasion and swelling to right side of forehead.   EYES: PERRL; EOM intact   ENMT: External appears normal  NECK: Supple; non-tender. No midline spinal tenderness to palpation.   CARD: Normal S1, S2; no murmurs, rubs, or gallops. Tachycardic.   RESP: Normal chest excursion with respiration; breath sounds clear and equal bilaterally  ABD: Soft, non-distended; non-tender  EXT: Normal ROM in all four extremities; mild pain to posterior left knee and calf. No swelling.   SKIN: Warm, dry, no rash.   NEURO:  No focal neurological deficiencies. Mildly tremulous. CONSTITUTIONAL: Non-toxic; in no apparent distress, disheveled, unstable gait  HEAD: Normocephalic; abrasion and swelling to right side of forehead.   EYES: PERRL; EOM intact   ENMT: External appears normal  NECK: Supple; non-tender. No midline spinal tenderness to palpation.   CARD: Normal S1, S2; no murmurs, rubs, or gallops. Tachycardic.   RESP: Normal chest excursion with respiration; breath sounds clear and equal bilaterally  ABD: Soft, non-distended; non-tender  EXT: Normal ROM in all four extremities; mild pain to posterior left knee and calf. No swelling.   SKIN: Warm, dry, no rash.   NEURO: Mildly tremulous. No focal neurological deficiencies, CN 2-12 intact BL, no dysmetria, no pronator drift, strength/sensation intact throughout, normal cognition
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
## 2022-09-16 NOTE — ED PROVIDER NOTE - PATIENT PORTAL LINK FT
You can access the FollowMyHealth Patient Portal offered by [facility] by registering at the following website: [email or web address]. By joining [employer]’s FollowMyHealth portal, you will also be able to view your health information using other applications (apps) compatible with our system.

## 2022-09-16 NOTE — ED PROVIDER NOTE - CARE PLAN
1 Principal Discharge DX:	Seizure disorder   Principal Discharge DX:	Seizure disorder  Secondary Diagnosis:	Alcohol intoxication

## 2022-09-16 NOTE — ED PROVIDER NOTE - CLINICAL SUMMARY MEDICAL DECISION MAKING FREE TEXT BOX
Concern for epileptic seizure with inconsistent Keppra use and possible alcohol withdrawal. Current CIWA is 4. Given pt's history, will medicate with Keppra and librium. Headache s/p fall, not on AC. Will CT head and c-spine. LLE pain in calf. Low suspicion for DVT, however given pt has poor medication compliance and is a poor historian, will US LLE to r/o DVT. No evidence of traumatic injury to LLE. Pt is ambulatory in ED. Seizure with known seizure disorder, likely due to inconsistent Keppra use. Pt exhibiting possible alcohol withdrawal symptoms. Current CIWA is 4. Will medicate with Keppra and librium. Headache s/p fall, not on AC. He is refusing CT. LLE pain in calf that is chronic and at baseline. Low suspicion for DVT, no distal swelling of leg. No evidence of recent traumatic injury to LLE. Pt is poorly ambulatory in ED, will require PT eval. Seizure with known seizure disorder, likely due to inconsistent Keppra use. Pt exhibiting possible alcohol withdrawal symptoms. Current CIWA is 4. Will medicate with Keppra and librium. Headache s/p fall, not on AC. He is refusing CT. LLE pain in calf that is chronic and at baseline. Low suspicion for DVT, no distal swelling of leg. No evidence of recent traumatic injury to LLE. Pt is poorly ambulatory in ED, will require PT eval. Pt states he has keppra and will take it at home.    No acute signs or history of trauma, no Hx of psychiatric complication or coingestion. Will reassess after a period of metabolism; if suman and without emergent complaint or finding, will be safe for discharge.
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
## 2022-09-16 NOTE — ED PROVIDER NOTE - NS ED ROS FT
CONSTITUTIONAL: No fever, no chills, no fatigue, +alcohol intoxication  EYES: No eye redness, no visual changes  ENT: No ear pain, no sore throat  CARDIOVASCULAR: No chest pain, no palpitations  RESPIRATORY: No cough, no SOB  GI: No abdominal pain, no nausea, no vomiting, no constipation, no diarrhea  GENITOURINARY: No dysuria, no frequency, no hematuria  MUSCULOSKELETAL: No back pain, no joint pain, no myalgias  SKIN: No rash, no peripheral edema  NEURO: No confusion, +seizure, headache    ALL OTHER SYSTEMS NEGATIVE. CONSTITUTIONAL: No fever, no chills, no fatigue, +alcohol intoxication  EYES: No eye redness, no visual changes  ENT: No ear pain, no sore throat  CARDIOVASCULAR: No chest pain, no palpitations  RESPIRATORY: No cough, no SOB  GI: No abdominal pain, no nausea, no vomiting, no constipation, no diarrhea  GENITOURINARY: No dysuria, no frequency, no hematuria  MUSCULOSKELETAL: No back pain, + L knee joint pain, no myalgias  SKIN: No rash, no peripheral edema  NEURO: No confusion, +seizure, +headache    ALL OTHER SYSTEMS NEGATIVE.

## 2022-09-16 NOTE — ED PROVIDER NOTE - PROGRESS NOTE DETAILS
Pt sleeping comfortably. No complaints, mildly tremulous, CIWA 2. Pt walking and tolerating PO. Mildly tremulous, CIWA 4. Pt walking and tolerating PO. Mildly tremulous, CIWA 2.  Pt states he would like to leave, refusing walker. CM has arranged ride to shelter.  Observed for sobriety. At time of discharge, patient is A&OX3, calm and cooperative, ambulatory with steady gait, tolerating PO.

## 2022-09-20 ENCOUNTER — HOSPITAL ENCOUNTER (INPATIENT)
Dept: HOSPITAL 74 - YASAS | Age: 47
LOS: 6 days | Discharge: HOME | End: 2022-09-26
Attending: PSYCHIATRY & NEUROLOGY | Admitting: ALLERGY & IMMUNOLOGY
Payer: COMMERCIAL

## 2022-09-20 VITALS — BODY MASS INDEX: 21.4 KG/M2

## 2022-09-20 DIAGNOSIS — Z59.01: ICD-10-CM

## 2022-09-20 DIAGNOSIS — Z56.0: ICD-10-CM

## 2022-09-20 DIAGNOSIS — F13.20: ICD-10-CM

## 2022-09-20 DIAGNOSIS — F17.210: ICD-10-CM

## 2022-09-20 DIAGNOSIS — F10.230: Primary | ICD-10-CM

## 2022-09-20 DIAGNOSIS — J45.20: ICD-10-CM

## 2022-09-20 DIAGNOSIS — E87.6: ICD-10-CM

## 2022-09-20 DIAGNOSIS — F32.A: ICD-10-CM

## 2022-09-20 DIAGNOSIS — R73.9: ICD-10-CM

## 2022-09-20 DIAGNOSIS — Z87.820: ICD-10-CM

## 2022-09-20 DIAGNOSIS — E46: ICD-10-CM

## 2022-09-20 DIAGNOSIS — Z99.89: ICD-10-CM

## 2022-09-20 DIAGNOSIS — G40.909: ICD-10-CM

## 2022-09-20 PROCEDURE — U0005 INFEC AGEN DETEC AMPLI PROBE: HCPCS

## 2022-09-20 PROCEDURE — HZ2ZZZZ DETOXIFICATION SERVICES FOR SUBSTANCE ABUSE TREATMENT: ICD-10-PCS | Performed by: SURGERY

## 2022-09-20 PROCEDURE — U0003 INFECTIOUS AGENT DETECTION BY NUCLEIC ACID (DNA OR RNA); SEVERE ACUTE RESPIRATORY SYNDROME CORONAVIRUS 2 (SARS-COV-2) (CORONAVIRUS DISEASE [COVID-19]), AMPLIFIED PROBE TECHNIQUE, MAKING USE OF HIGH THROUGHPUT TECHNOLOGIES AS DESCRIBED BY CMS-2020-01-R: HCPCS

## 2022-09-20 RX ADMIN — Medication SCH TAB: at 14:15

## 2022-09-20 RX ADMIN — HYDROXYZINE PAMOATE SCH MG: 25 CAPSULE ORAL at 17:44

## 2022-09-20 RX ADMIN — Medication SCH MG: at 22:49

## 2022-09-20 RX ADMIN — HYDROXYZINE PAMOATE SCH MG: 25 CAPSULE ORAL at 14:14

## 2022-09-20 RX ADMIN — IBUPROFEN PRN MG: 400 TABLET, FILM COATED ORAL at 15:08

## 2022-09-20 RX ADMIN — HYDROXYZINE PAMOATE SCH MG: 25 CAPSULE ORAL at 22:49

## 2022-09-20 SDOH — ECONOMIC STABILITY - INCOME SECURITY: UNEMPLOYMENT, UNSPECIFIED: Z56.0

## 2022-09-20 SDOH — ECONOMIC STABILITY - HOUSING INSECURITY: SHELTERED HOMELESSNESS: Z59.01

## 2022-09-21 LAB
ALBUMIN SERPL-MCNC: 3.1 G/DL (ref 3.4–5)
ALP SERPL-CCNC: 68 U/L (ref 45–117)
ALT SERPL-CCNC: 20 U/L (ref 13–61)
ANION GAP SERPL CALC-SCNC: 9 MMOL/L (ref 8–16)
AST SERPL-CCNC: 23 U/L (ref 15–37)
BILIRUB SERPL-MCNC: 0.4 MG/DL (ref 0.2–1)
BUN SERPL-MCNC: 7.1 MG/DL (ref 7–18)
CALCIUM SERPL-MCNC: 9.3 MG/DL (ref 8.5–10.1)
CHLORIDE SERPL-SCNC: 104 MMOL/L (ref 98–107)
CO2 SERPL-SCNC: 28 MMOL/L (ref 21–32)
CREAT SERPL-MCNC: 0.6 MG/DL (ref 0.55–1.3)
DEPRECATED RDW RBC AUTO: 14.9 % (ref 11.9–15.9)
GLUCOSE SERPL-MCNC: 113 MG/DL (ref 74–106)
HCT VFR BLD CALC: 38.8 % (ref 35.4–49)
HGB BLD-MCNC: 13.2 GM/DL (ref 11.7–16.9)
MCH RBC QN AUTO: 32.4 PG (ref 25.7–33.7)
MCHC RBC AUTO-ENTMCNC: 34 G/DL (ref 32–35.9)
MCV RBC: 95.2 FL (ref 80–96)
PLATELET # BLD AUTO: 256 10^3/UL (ref 134–434)
PMV BLD: 7.5 FL (ref 7.5–11.1)
PROT SERPL-MCNC: 6.2 G/DL (ref 6.4–8.2)
RBC # BLD AUTO: 4.08 M/MM3 (ref 4–5.6)
SODIUM SERPL-SCNC: 141 MMOL/L (ref 136–145)
WBC # BLD AUTO: 5.9 K/MM3 (ref 4–10)

## 2022-09-21 RX ADMIN — Medication SCH MG: at 23:02

## 2022-09-21 RX ADMIN — ACETAMINOPHEN PRN MG: 325 TABLET ORAL at 18:26

## 2022-09-21 RX ADMIN — HYDROXYZINE PAMOATE SCH MG: 25 CAPSULE ORAL at 23:03

## 2022-09-21 RX ADMIN — HYDROXYZINE PAMOATE SCH MG: 25 CAPSULE ORAL at 18:26

## 2022-09-21 RX ADMIN — Medication SCH TAB: at 11:01

## 2022-09-21 RX ADMIN — HYDROXYZINE PAMOATE SCH: 25 CAPSULE ORAL at 15:28

## 2022-09-21 RX ADMIN — HYDROXYZINE PAMOATE SCH MG: 25 CAPSULE ORAL at 11:02

## 2022-09-21 RX ADMIN — Medication SCH MG: at 23:03

## 2022-09-21 RX ADMIN — HYDROXYZINE PAMOATE SCH MG: 25 CAPSULE ORAL at 06:02

## 2022-09-21 RX ADMIN — POTASSIUM CHLORIDE SCH MEQ: 20 SOLUTION ORAL at 23:04

## 2022-09-22 RX ADMIN — Medication SCH TAB: at 11:47

## 2022-09-22 RX ADMIN — HYDROXYZINE PAMOATE SCH MG: 25 CAPSULE ORAL at 22:01

## 2022-09-22 RX ADMIN — HYDROXYZINE PAMOATE SCH MG: 25 CAPSULE ORAL at 05:52

## 2022-09-22 RX ADMIN — HYDROXYZINE PAMOATE SCH MG: 25 CAPSULE ORAL at 14:51

## 2022-09-22 RX ADMIN — POTASSIUM CHLORIDE SCH MEQ: 20 SOLUTION ORAL at 11:47

## 2022-09-22 RX ADMIN — HYDROXYZINE PAMOATE SCH MG: 25 CAPSULE ORAL at 17:42

## 2022-09-22 RX ADMIN — Medication SCH MG: at 22:02

## 2022-09-22 RX ADMIN — POTASSIUM CHLORIDE SCH MEQ: 20 SOLUTION ORAL at 22:02

## 2022-09-22 RX ADMIN — HYDROXYZINE PAMOATE SCH MG: 25 CAPSULE ORAL at 11:48

## 2022-09-23 RX ADMIN — HYDROXYZINE PAMOATE SCH MG: 25 CAPSULE ORAL at 18:11

## 2022-09-23 RX ADMIN — Medication SCH MG: at 22:46

## 2022-09-23 RX ADMIN — HYDROXYZINE PAMOATE SCH MG: 25 CAPSULE ORAL at 06:11

## 2022-09-23 RX ADMIN — HYDROXYZINE PAMOATE SCH MG: 25 CAPSULE ORAL at 22:46

## 2022-09-23 RX ADMIN — Medication SCH TAB: at 10:17

## 2022-09-23 RX ADMIN — HYDROXYZINE PAMOATE SCH MG: 25 CAPSULE ORAL at 14:52

## 2022-09-23 RX ADMIN — HYDROXYZINE PAMOATE SCH MG: 25 CAPSULE ORAL at 10:17

## 2022-09-24 RX ADMIN — HYDROXYZINE PAMOATE SCH MG: 25 CAPSULE ORAL at 14:48

## 2022-09-24 RX ADMIN — HYDROXYZINE PAMOATE SCH MG: 25 CAPSULE ORAL at 10:34

## 2022-09-24 RX ADMIN — HYDROXYZINE PAMOATE SCH MG: 25 CAPSULE ORAL at 22:49

## 2022-09-24 RX ADMIN — HYDROXYZINE PAMOATE SCH MG: 25 CAPSULE ORAL at 17:59

## 2022-09-24 RX ADMIN — Medication SCH MG: at 22:49

## 2022-09-24 RX ADMIN — Medication SCH TAB: at 10:34

## 2022-09-24 RX ADMIN — HYDROXYZINE PAMOATE SCH MG: 25 CAPSULE ORAL at 06:29

## 2022-09-25 RX ADMIN — Medication SCH TAB: at 09:52

## 2022-09-25 RX ADMIN — ACETAMINOPHEN PRN MG: 325 TABLET ORAL at 18:03

## 2022-09-25 RX ADMIN — HYDROXYZINE PAMOATE SCH MG: 25 CAPSULE ORAL at 18:02

## 2022-09-25 RX ADMIN — HYDROXYZINE PAMOATE SCH MG: 25 CAPSULE ORAL at 14:50

## 2022-09-25 RX ADMIN — HYDROXYZINE PAMOATE SCH MG: 25 CAPSULE ORAL at 22:43

## 2022-09-25 RX ADMIN — Medication SCH MG: at 22:41

## 2022-09-25 RX ADMIN — HYDROXYZINE PAMOATE SCH MG: 25 CAPSULE ORAL at 09:52

## 2022-09-25 RX ADMIN — HYDROXYZINE PAMOATE SCH MG: 25 CAPSULE ORAL at 07:02

## 2022-09-26 VITALS — SYSTOLIC BLOOD PRESSURE: 111 MMHG | TEMPERATURE: 98.4 F | HEART RATE: 59 BPM | DIASTOLIC BLOOD PRESSURE: 78 MMHG

## 2022-09-26 VITALS — RESPIRATION RATE: 16 BRPM

## 2022-09-26 RX ADMIN — Medication SCH TAB: at 10:31

## 2022-09-26 RX ADMIN — HYDROXYZINE PAMOATE SCH MG: 25 CAPSULE ORAL at 10:32

## 2022-09-26 RX ADMIN — IBUPROFEN PRN MG: 400 TABLET, FILM COATED ORAL at 06:43

## 2022-09-26 RX ADMIN — HYDROXYZINE PAMOATE SCH MG: 25 CAPSULE ORAL at 06:33

## 2023-04-26 ENCOUNTER — HOSPITAL ENCOUNTER (INPATIENT)
Dept: HOSPITAL 74 - YASAS | Age: 48
LOS: 1 days | Discharge: LEFT BEFORE DISCHARGE | DRG: 770 | End: 2023-04-27
Attending: SURGERY | Admitting: ALLERGY & IMMUNOLOGY
Payer: COMMERCIAL

## 2023-04-26 VITALS — BODY MASS INDEX: 24.3 KG/M2

## 2023-04-26 DIAGNOSIS — F17.210: ICD-10-CM

## 2023-04-26 DIAGNOSIS — G89.29: ICD-10-CM

## 2023-04-26 DIAGNOSIS — M62.81: ICD-10-CM

## 2023-04-26 DIAGNOSIS — Z99.89: ICD-10-CM

## 2023-04-26 DIAGNOSIS — F10.230: Primary | ICD-10-CM

## 2023-04-26 DIAGNOSIS — F41.9: ICD-10-CM

## 2023-04-26 DIAGNOSIS — F32.A: ICD-10-CM

## 2023-04-26 DIAGNOSIS — G40.909: ICD-10-CM

## 2023-04-26 DIAGNOSIS — M54.50: ICD-10-CM

## 2023-04-26 PROCEDURE — HZ2ZZZZ DETOXIFICATION SERVICES FOR SUBSTANCE ABUSE TREATMENT: ICD-10-PCS | Performed by: SURGERY

## 2023-04-26 PROCEDURE — U0005 INFEC AGEN DETEC AMPLI PROBE: HCPCS

## 2023-04-26 PROCEDURE — U0003 INFECTIOUS AGENT DETECTION BY NUCLEIC ACID (DNA OR RNA); SEVERE ACUTE RESPIRATORY SYNDROME CORONAVIRUS 2 (SARS-COV-2) (CORONAVIRUS DISEASE [COVID-19]), AMPLIFIED PROBE TECHNIQUE, MAKING USE OF HIGH THROUGHPUT TECHNOLOGIES AS DESCRIBED BY CMS-2020-01-R: HCPCS

## 2023-04-26 RX ADMIN — LACOSAMIDE SCH MG: 50 TABLET, FILM COATED ORAL at 22:03

## 2023-04-26 RX ADMIN — BACITRACIN ZINC SCH BAG: 500 OINTMENT TOPICAL at 22:04

## 2023-04-27 VITALS
RESPIRATION RATE: 18 BRPM | SYSTOLIC BLOOD PRESSURE: 116 MMHG | HEART RATE: 86 BPM | TEMPERATURE: 97.1 F | DIASTOLIC BLOOD PRESSURE: 79 MMHG

## 2023-04-27 RX ADMIN — LACOSAMIDE SCH MG: 50 TABLET, FILM COATED ORAL at 10:50

## 2023-04-27 RX ADMIN — BACITRACIN ZINC SCH: 500 OINTMENT TOPICAL at 11:01

## 2023-04-27 NOTE — ED PROVIDER NOTE - MUSCULOSKELETAL, MLM
----- Message from Ashlee Alvarado RN sent at 4/25/2023  4:12 PM CDT -----  Regarding: FW: Prior Authorization  Contact: Estefany @ [phone]    ----- Message -----  From: Adrienne Doyle  Sent: 4/25/2023   3:22 PM CDT  To: [facility] Staff  Subject: Prior Authorization                              Estefany from Cover [employer] med[employer] is calling because office need a Prior Authorization of medication AUBAGIO 14 mg Tab      
Bette Carrasquillo Nicole: [id number] - PA Case ID: [id number] - Rx #: [id number]    PA submitted via [facility] for Aubagio    
PA Case: [id number], Status: Approved, Coverage Starts on: 1/1/2023 12:00:00 AM, Coverage Ends on: 12/31/2023 12:00:00 AM. Questions? Contact [phone].    PA for Aubagio approved      
Spine appears normal, range of motion is not limited, no muscle or joint tenderness

## 2023-04-30 ENCOUNTER — EMERGENCY (EMERGENCY)
Facility: HOSPITAL | Age: 48
LOS: 1 days | Discharge: HOME | End: 2023-04-30
Admitting: EMERGENCY MEDICINE
Payer: MEDICAID

## 2023-04-30 VITALS
TEMPERATURE: 98 F | HEART RATE: 92 BPM | SYSTOLIC BLOOD PRESSURE: 110 MMHG | DIASTOLIC BLOOD PRESSURE: 74 MMHG | RESPIRATION RATE: 16 BRPM | OXYGEN SATURATION: 98 %

## 2023-04-30 VITALS
SYSTOLIC BLOOD PRESSURE: 134 MMHG | TEMPERATURE: 98 F | HEART RATE: 110 BPM | DIASTOLIC BLOOD PRESSURE: 74 MMHG | WEIGHT: 160.06 LBS | OXYGEN SATURATION: 98 % | RESPIRATION RATE: 16 BRPM

## 2023-04-30 LAB — GLUCOSE BLDC GLUCOMTR-MCNC: 95 MG/DL — SIGNIFICANT CHANGE UP (ref 70–99)

## 2023-04-30 PROCEDURE — 99284 EMERGENCY DEPT VISIT MOD MDM: CPT
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
## 2023-04-30 NOTE — ED PROVIDER NOTE - PATIENT PORTAL LINK FT
You can access the FollowMyHealth Patient Portal offered by [facility] by registering at the following website: [email or web address]. By joining [employer]’s FollowMyHealth portal, you will also be able to view your health information using other applications (apps) compatible with our system.
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
## 2023-04-30 NOTE — ED PROVIDER NOTE - PHYSICAL EXAMINATION
Gen - Unkempt M, +AOB, no acute distress  Skin - warm, dry, intact  HEENT - AT/NC, PERRL, mild conjunctival injection, pupils 3mm b/l, TM intact with no hemotympanium b/l, chronic deformity of the nasal bridge, septum deviated, no hematoma noted, no facial contusion or periorbital ecchymosis, o/p clear, uvula midline, airway patent, neck supple with no step off or midline tenderness, FROM   CV - S1S2, R/R/R  Resp - respiration non-labored, CTAB, symmetric bs b/l, no r/r/w  GI - NABS, soft, ND, NT, no rebound or guarding, no CVAT b/l  MS - w/w/p, no c/c/e, calves supple and NT  Neuro - Alert and awake and oriented, slightly slurred speech, ambulatory with unsteady gait, no focal deficits

## 2023-04-30 NOTE — ED ADULT NURSE NOTE - CAS TRG GEN SKIN COLOR
Nephrology Progress Note                                                                                                                                                                                                                                                                                                                                                               Office : [phone]     Fax :[phone]    Patient's Name: Ivonne Restrepo    10/5/2022    Reason for Consult: ZANA  Requesting Physician:  Mac Galvan MD    Interval History:      Cr better   Good UO   Remains on Trach   Bronch today       Past Medical History:   Diagnosis Date    ZANA (acute kidney injury) ([facility] [address] 9/26/2022    Carotid stenosis, left 10/12/2011    Chronic back pain     Chronic systolic (congestive) heart failure 11/34/6964    Diastolic CHF ([address]     Erectile dysfunction     Hyperlipidemia     Hypertension     Osteoarthritis     Restrictive lung disease     Type II or unspecified type diabetes mellitus without mention of complication, not stated as uncontrolled        Past Surgical History:   Procedure Laterality Date    CARDIAC CATHETERIZATION  06/2022    KNEE SURGERY Left     PLEURAL CATH INSERTION N/A 9/19/2022    PLEURAL CATHETER PLACEMENT; INTERCOSTAL NERVE BLOCK X4 performed by Víctor Burgos MD at [facility] Bilateral     THORACOSCOPY Right 9/19/2022    RIGHT VIDEO ASSISTED THORASCOPIC SURGERY AND; performed by Víctor Burgos MD at [address] N/A 9/30/2022    TRACHEOSTOMY performed by Jericho Padilla MD at [address] [facility] [address] History   Problem Relation Age of Onset    Hearing Loss Father     Heart Disease Father 70        MI    High Blood Pressure Father     Diabetes Maternal Grandmother         hypoglycemic    Hearing Loss Maternal Grandmother     Arthritis Maternal Grandfather         reports that he quit smoking about 20 years ago. His smoking use included cigarettes.  He has a 80.00 pack-year smoking history. He has never used smokeless tobacco. He reports current alcohol use. He reports that he does not use drugs. Allergies:   Torsemide and Dye [iodides]    Current Medications:    pantoprazole (PROTONIX) injection 40 mg, BID  norepinephrine (LEVOPHED) 16 mg in sodium chloride 0.9 % 250 mL infusion, Continuous  insulin lispro (1 Unit Dial) (HUMALOG/ADMELOG) pen 0-16 Units, Q4H  insulin glargine (LANTUS;BASAGLAR) injection pen 20 Units, Nightly  sodium chloride flush 0.9 % injection 5-40 mL, 2 times per day  sodium chloride flush 0.9 % injection 5-40 mL, PRN  0.9 % sodium chloride infusion, PRN  HYDROmorphone (DILAUDID) injection 0.5 mg, Q5 Min PRN  labetalol (NORMODYNE;TRANDATE) injection 10 mg, Q15 Min PRN  Venelex ointment, BID  HYDROmorphone (DILAUDID) injection 0.25 mg, Q3H PRN  amiodarone (CORDARONE) 150 mg in dextrose 5 % 100 mL bolus, Once  amiodarone (CORDARONE) tablet 200 mg, Daily  acetaminophen (TYLENOL) 160 MG/5ML solution 650 mg, Q6H PRN  chlorhexidine (PERIDEX) 0.12 % solution 15 mL, BID  [Held by provider] metoprolol (LOPRESSOR) injection 5 mg, Q6H  haloperidol lactate (HALDOL) injection 2 mg, Q6H PRN  albuterol (PROVENTIL) nebulizer solution 2.5 mg, Q4H PRN  [Held by provider] apixaban (ELIQUIS) tablet 5 mg, BID  sodium chloride flush 0.9 % injection 5-40 mL, 2 times per day  sodium chloride flush 0.9 % injection 5-40 mL, PRN  0.9 % sodium chloride infusion, PRN  polyethylene glycol (GLYCOLAX) packet 17 g, Daily  ondansetron (ZOFRAN-ODT) disintegrating tablet 4 mg, Q8H PRN   Or  ondansetron (ZOFRAN) injection 4 mg, Q6H PRN  glucose chewable tablet 16 g, PRN  dextrose bolus 10% 125 mL, PRN   Or  dextrose bolus 10% 250 mL, PRN  glucagon (rDNA) injection 1 mg, PRN  dextrose 10 % infusion, Continuous PRN  hydrALAZINE (APRESOLINE) injection 5 mg, Q15 Min PRN  levalbuterol (XOPENEX) nebulization 0.63 mg, Q4H PRN      Review of Systems:   14 point ROS obtained but were negative except mentioned in HPI      Physical exam:     Vitals:  BP (!) 98/51   Pulse 85   Temp 97.8 °F (36.6 °C) (Axillary)   Resp 14   Ht 6' 0.99\" (1.854 m)   Wt 178 lb 12.7 oz (81.1 kg)   SpO2 96%   BMI 23.59 kg/m²   Constitutional:  on MV  Skin: no rash, turgor wnl  Heent:  eomi, mmm  Neck: no bruits or jvd noted  Cardiovascular:  S1, S2 without m/r/g  Respiratory: trach  Abdomen:  +bs, soft, nt, nd  Ext: bilateral lower extremity edema R>L  Psychiatric: mood and affect appropriate  Musculoskeletal:  Rom, muscular strength intact    Data:   Labs:  CBC:   Recent Labs     10/03/22  0407 10/04/22  0425 10/05/22  0607   WBC 22.4* 27.0* 21.7*   HGB 7.5* 7.6* 7.5*    454* 389       BMP:    Recent Labs     10/03/22  0407 10/04/22  0426 10/05/22  0607   * 148* 146*   K 3.5 3.8 3.4*    102 102   CO2 36* 35* 37*   * 114* 110*   CREATININE 1.8* 1.7* 1.5*   GLUCOSE 185* 282* 110*       Ca/Mg/Phos:   Recent Labs     10/03/22  0407 10/03/22  1229 10/04/22  0425 10/04/22  0426 10/05/22  0607   CALCIUM 8.0*  --   --  8.0* 8.1*   MG  --  3.20* 3.30*  --  3.30*   PHOS 4.7  --   --  4.5 3.8       Hepatic: No results for input(s): AST, ALT, ALB, BILITOT, ALKPHOS in the last 72 hours. Troponin: No results for input(s): TROPONINI in the last 72 hours. BNP: No results for input(s): BNP in the last 72 hours. Lipids:   Recent Labs     10/03/22  0407   TRIG 77         ABGs:   No results for input(s): PHART, PO2ART, RUT0IJJ in the last 72 hours. INR: No results for input(s): INR in the last 72 hours. UA:No results for input(s): Anselmo Nordmann, GLUCOSEU, BILIRUBINUR, Barron Anu, BLOODU, PHUR, PROTEINU, UROBILINOGEN, NITRU, LEUKOCYTESUR, LABMICR, URINETYPE in the last 72 hours. Urine Microscopic: No results for input(s): LABCAST, BACTERIA, COMU, HYALCAST, WBCUA, RBCUA, EPIU in the last 72 hours. Urine Culture: No results for input(s): LABURIN in the last 72 hours.   Urine Chemistry:   No Normal for race results for input(s): CLUR, LABCREA, PROTEINUR, NAUR in the last 72 hours. IMAGING:  CT CHEST ABDOMEN PELVIS WO CONTRAST   Final Result      1. Severe pneumomediastinum. 2. Small to moderate right hydropneumothorax with similar fluid and gas components with a right-sided Pleurx catheter. 3. Moderate loculated left pleural effusion with atelectasis of the left lower lobe with patency of the central left lower lobe bronchi. 4. Fluid/material filling the bronchus intermedius and right lower lobe bronchi with complete consolidation and volume loss of the right lower lobe. Differential diagnosis would include mucous plugging or obstructing lesion. 5. Tracheostomy tube tip within the trachea   6. Severe subcutaneous emphysema in the neck. CT ABDOMEN AND PELVIS:      FINDINGS:      LIVER: Normal.      GALLBLADDER AND BILIARY TREE: No calcified gallstones. No gallbladder distention. No intra- or extrahepatic biliary dilatation. PANCREAS: Normal.      SPLEEN: Normal.      ADRENAL GLANDS: Normal.      KIDNEYS AND URETERS: Normal.      URINARY BLADDER: Ansari catheter present within collapsed urinary bladder. REPRODUCTIVE ORGANS: No associated masses. BOWEL: Normal diameter, nonobstructed. Feeding tube tip at the level of the ligament of Treitz. LYMPH NODES: No abnormally enlarged nodes. PERITONEUM/RETROPERITONEUM: Severe pneumoperitoneum extends into the upper abdomen with some of the symmetric multiseptated gas appearing deep to the diaphragm consistent with mild pneumoperitoneum (series 601 was 101). This is likely related to    pneumonia mediastinum dissecting into the abdomen. No fluid collection in the abdomen or pelvis. No ascites. VESSELS: Extensive atherosclerotic calcification of the aorta and iliac arteries. ABDOMINAL WALL: No acute abnormality. BONES: No acute abnormality. Mild chronic L1 compression fracture with previous vertebroplasty. IMPRESSION:      1. Severe pneumomediastinum extends into the upper abdomen with small pneumoperitoneum likely related to extension of pneumoperitoneum into the upper peritoneal cavity. 2. No fluid collection in the abdomen or pelvis. Results were discussed with the surgical team at 7:00 PM on 10/3/2022. XR CHEST PORTABLE   Final Result      Stable appearance of loculated right pneumothorax, with loculated components in the lateral right midlung region and in the right lateral costophrenic sulcus. Stable scattered areas of atelectasis versus scar, most prominent in the medial right lung base and in the left lateral lung base. XR CHEST PORTABLE   Final Result      Small right basilar pneumothorax. Right basilar chest tube without change. Patchy consolidation in the right mid and lower lung as well as the left lower lung-atelectasis versus pneumonia. Trace left pleural effusion. Normal heart size. Lines and tubes without change. Mild improvement in degree of subcutaneous emphysema in the chest and neck. XR CHEST 1 VIEW   Final Result      1. Stable small right-sided pneumothorax and prominent subcutaneous emphysema along the neck and upper superior chest wall, greater in right lung stable   2. Stable left basilar consolidation and pleural effusion      3. Stable appearing perihilar accentuated markings or airspace disease            XR CHEST PORTABLE   Final Result      Stable small right-sided pneumothorax. More prominent emphysema over the lower neck. No change in bilateral airspace disease. Stable left pleural effusion. XR CHEST PORTABLE   Final Result   1. Bilateral multifocal pneumonia with improvement in the right    pulmonary consolidation since prior study from 9/23/22   2. Mild to moderate left pleural effusion          XR CHEST PORTABLE   Final Result   1. Support lines and tubes are stable.    2.  Stable small right lateral pneumothorax and right basilar    chest tube. 3.  Stable patchy bilateral airspace disease. XR CHEST PORTABLE   Final Result   1. Satisfactory position of right internal jugular central line   2. No interval change in endotracheal tube and nasogastric tube positioning. 3. Extensive bilateral airspace disease with no changes. 4. Left pleural effusion with retrocardiac opacity, unchanged   5. Small stable tiny right lateral pneumothorax and stable position of right chest tube,, Pleurx catheter. XR ABDOMEN (KUB) (SINGLE AP VIEW)   Final Result   1. No residual pneumothorax. 2.  Mild patchy airspace disease bilaterally worse on the right than on prior examination. 3.  Non-obstructive bowel gas pattern. Mildly distended stomach. XR CHEST PORTABLE   Final Result   1. No residual pneumothorax. 2.  Mild patchy airspace disease bilaterally worse on the right than on prior examination. 3.  Non-obstructive bowel gas pattern. Mildly distended stomach. XR CHEST PORTABLE   Final Result      1. Small right pneumothorax appears slightly increased. 2. Mild patchy airspace opacity bilaterally. XR CHEST PORTABLE   Final Result     Pleurx catheter at the right lung base. Trace right    pneumothorax is unchanged. XR CHEST PORTABLE   Final Result      Right-sided chest tube evident in the base, its tip medially. Improvement in the right-sided pneumothorax, since earlier today. Possible medial collapse of the right lower lobe. Small left effusion. Patchy airspace density/atelectasis in the lungs bilaterally, with no other significant change. XR CHEST PORTABLE   Final Result   1. Right-sided Pleurx catheter in satisfactory position in the lung bases   2. Right-sided post operative pneumothorax, approximately 10%             Assessment/Plan   ZANA  - suspect this is contrast nephropathy  - baseline Cre 0.7.  Normal on admission.  - Cre 0.7-->1.7 but stabilized  - Hold diuretics   - BNP 3k, Protein:Cre 0.3, FENa 0.4%  - closely monitor UOP  - avoid nephrotoxic agent     2. Hypernatremia  - improving   - continue free water   - will continue to monitor     3. Hypotension  - pressors as needed     4. Anemia  - daily CBC    5. Acid- base/ Electrolyte imbalance   - optimize lytes    6. Acute hypoxic resp failure  - s/p VATS on 9/19/22 for recurrent pleural effusions  - Intensivist and CTS following    7.  CHF   - EF 65% on 6/16/22 via cardiac cath        Ilan Rios MD

## 2023-05-01 ENCOUNTER — HOSPITAL ENCOUNTER (INPATIENT)
Dept: HOSPITAL 74 - YASAS | Age: 48
LOS: 4 days | Discharge: HOME | End: 2023-05-05
Attending: SURGERY | Admitting: ALLERGY & IMMUNOLOGY
Payer: COMMERCIAL

## 2023-05-01 VITALS — BODY MASS INDEX: 22 KG/M2

## 2023-05-01 DIAGNOSIS — G89.29: ICD-10-CM

## 2023-05-01 DIAGNOSIS — J45.20: ICD-10-CM

## 2023-05-01 DIAGNOSIS — F10.24: ICD-10-CM

## 2023-05-01 DIAGNOSIS — F10.282: ICD-10-CM

## 2023-05-01 DIAGNOSIS — Z99.89: ICD-10-CM

## 2023-05-01 DIAGNOSIS — F10.230: Primary | ICD-10-CM

## 2023-05-01 DIAGNOSIS — Z87.820: ICD-10-CM

## 2023-05-01 DIAGNOSIS — F17.210: ICD-10-CM

## 2023-05-01 DIAGNOSIS — G40.909: ICD-10-CM

## 2023-05-01 DIAGNOSIS — R26.89: ICD-10-CM

## 2023-05-01 DIAGNOSIS — F41.8: ICD-10-CM

## 2023-05-01 DIAGNOSIS — M54.50: ICD-10-CM

## 2023-05-01 PROCEDURE — U0005 INFEC AGEN DETEC AMPLI PROBE: HCPCS

## 2023-05-01 PROCEDURE — U0003 INFECTIOUS AGENT DETECTION BY NUCLEIC ACID (DNA OR RNA); SEVERE ACUTE RESPIRATORY SYNDROME CORONAVIRUS 2 (SARS-COV-2) (CORONAVIRUS DISEASE [COVID-19]), AMPLIFIED PROBE TECHNIQUE, MAKING USE OF HIGH THROUGHPUT TECHNOLOGIES AS DESCRIBED BY CMS-2020-01-R: HCPCS

## 2023-05-01 PROCEDURE — HZ2ZZZZ DETOXIFICATION SERVICES FOR SUBSTANCE ABUSE TREATMENT: ICD-10-PCS | Performed by: SURGERY

## 2023-05-01 RX ADMIN — LACOSAMIDE SCH MG: 50 TABLET, FILM COATED ORAL at 22:26

## 2023-05-01 RX ADMIN — Medication SCH MG: at 22:16

## 2023-05-01 RX ADMIN — Medication PRN MG: at 22:16

## 2023-05-01 RX ADMIN — LEVETIRACETAM SCH MG: 500 TABLET, FILM COATED ORAL at 22:15

## 2023-05-02 RX ADMIN — LACOSAMIDE SCH MG: 50 TABLET, FILM COATED ORAL at 22:26

## 2023-05-02 RX ADMIN — Medication SCH MG: at 22:26

## 2023-05-02 RX ADMIN — Medication SCH TAB: at 10:34

## 2023-05-02 RX ADMIN — Medication PRN MG: at 22:26

## 2023-05-02 RX ADMIN — LEVETIRACETAM SCH MG: 500 TABLET, FILM COATED ORAL at 10:33

## 2023-05-02 RX ADMIN — BACITRACIN ZINC SCH: 500 OINTMENT TOPICAL at 22:40

## 2023-05-02 RX ADMIN — BACITRACIN ZINC SCH GM: 500 OINTMENT TOPICAL at 22:26

## 2023-05-02 RX ADMIN — LACOSAMIDE SCH: 50 TABLET, FILM COATED ORAL at 22:44

## 2023-05-02 RX ADMIN — ACETAMINOPHEN PRN MG: 325 TABLET ORAL at 20:36

## 2023-05-02 RX ADMIN — LACOSAMIDE SCH MG: 50 TABLET, FILM COATED ORAL at 10:33

## 2023-05-02 RX ADMIN — Medication SCH: at 22:44

## 2023-05-03 DIAGNOSIS — Z87.891 PERSONAL HISTORY OF NICOTINE DEPENDENCE: ICD-10-CM

## 2023-05-03 DIAGNOSIS — J45.909 UNSPECIFIED ASTHMA, UNCOMPLICATED: ICD-10-CM

## 2023-05-03 DIAGNOSIS — G40.909 EPILEPSY, UNSPECIFIED, NOT INTRACTABLE, WITHOUT STATUS EPILEPTICUS: ICD-10-CM

## 2023-05-03 DIAGNOSIS — R41.82 ALTERED MENTAL STATUS, UNSPECIFIED: ICD-10-CM

## 2023-05-03 DIAGNOSIS — F10.129 ALCOHOL ABUSE WITH INTOXICATION, UNSPECIFIED: ICD-10-CM

## 2023-05-03 RX ADMIN — BACITRACIN ZINC SCH GM: 500 OINTMENT TOPICAL at 22:52

## 2023-05-03 RX ADMIN — LACOSAMIDE SCH MG: 50 TABLET, FILM COATED ORAL at 22:51

## 2023-05-03 RX ADMIN — BACITRACIN ZINC SCH GM: 500 OINTMENT TOPICAL at 09:05

## 2023-05-03 RX ADMIN — Medication PRN MG: at 22:51

## 2023-05-03 RX ADMIN — IBUPROFEN PRN MG: 600 TABLET, FILM COATED ORAL at 00:46

## 2023-05-03 RX ADMIN — Medication SCH: at 10:28

## 2023-05-03 RX ADMIN — IBUPROFEN PRN MG: 600 TABLET, FILM COATED ORAL at 15:11

## 2023-05-03 RX ADMIN — LACOSAMIDE SCH: 50 TABLET, FILM COATED ORAL at 10:28

## 2023-05-03 RX ADMIN — Medication SCH MG: at 22:51

## 2023-05-04 VITALS — HEART RATE: 76 BPM

## 2023-05-04 RX ADMIN — LACOSAMIDE SCH MG: 50 TABLET, FILM COATED ORAL at 10:21

## 2023-05-04 RX ADMIN — Medication SCH: at 21:34

## 2023-05-04 RX ADMIN — ACETAMINOPHEN PRN MG: 325 TABLET ORAL at 17:33

## 2023-05-04 RX ADMIN — LACOSAMIDE SCH: 50 TABLET, FILM COATED ORAL at 22:33

## 2023-05-04 RX ADMIN — BACITRACIN ZINC SCH: 500 OINTMENT TOPICAL at 22:31

## 2023-05-04 RX ADMIN — Medication SCH TAB: at 10:21

## 2023-05-04 RX ADMIN — BACITRACIN ZINC SCH GM: 500 OINTMENT TOPICAL at 10:21

## 2023-05-05 VITALS — SYSTOLIC BLOOD PRESSURE: 107 MMHG | TEMPERATURE: 98.1 F | RESPIRATION RATE: 18 BRPM | DIASTOLIC BLOOD PRESSURE: 66 MMHG

## 2023-05-05 RX ADMIN — IBUPROFEN PRN MG: 600 TABLET, FILM COATED ORAL at 05:34

## 2023-05-25 NOTE — ED PROVIDER NOTE - CARDIOVASCULAR NEGATIVE STATEMENT, MLM
Mom calling, states patient is feeling worse, started throwing up this morning   Message confirmed with caller.   Call connected to [facility] Queue.  Routed to provider's clinical support pool.     no chest pain and no edema.

## 2023-06-08 NOTE — ED ADULT NURSE NOTE - FALL HARM RISK TYPE OF ASSESSMENT
Call back from  Ana Paula reyna nurse they do want  The myelogram for surgical  Discussion   
Daily Assessment

## 2023-06-21 ENCOUNTER — HOSPITAL ENCOUNTER (INPATIENT)
Dept: HOSPITAL 74 - YASAS | Age: 48
LOS: 5 days | Discharge: HOME | End: 2023-06-26
Attending: SURGERY | Admitting: ALLERGY & IMMUNOLOGY
Payer: COMMERCIAL

## 2023-06-21 VITALS — BODY MASS INDEX: 21.1 KG/M2

## 2023-06-21 DIAGNOSIS — J45.20: ICD-10-CM

## 2023-06-21 DIAGNOSIS — G40.909: ICD-10-CM

## 2023-06-21 DIAGNOSIS — M54.50: ICD-10-CM

## 2023-06-21 DIAGNOSIS — F10.230: Primary | ICD-10-CM

## 2023-06-21 DIAGNOSIS — F17.210: ICD-10-CM

## 2023-06-21 DIAGNOSIS — Z99.89: ICD-10-CM

## 2023-06-21 DIAGNOSIS — F41.9: ICD-10-CM

## 2023-06-21 DIAGNOSIS — F32.A: ICD-10-CM

## 2023-06-22 LAB
ALBUMIN SERPL-MCNC: 3.8 G/DL (ref 3.4–5)
ALP SERPL-CCNC: 110 U/L (ref 45–117)
ALT SERPL-CCNC: 22 U/L (ref 13–61)
ANION GAP SERPL CALC-SCNC: 9 MMOL/L (ref 8–16)
AST SERPL-CCNC: 17 U/L (ref 15–37)
BILIRUB SERPL-MCNC: 0.6 MG/DL (ref 0.2–1)
BUN SERPL-MCNC: 6.4 MG/DL (ref 7–18)
CALCIUM SERPL-MCNC: 9.8 MG/DL (ref 8.5–10.1)
CHLORIDE SERPL-SCNC: 103 MMOL/L (ref 98–107)
CO2 SERPL-SCNC: 25 MMOL/L (ref 21–32)
CREAT SERPL-MCNC: 0.8 MG/DL (ref 0.55–1.3)
DEPRECATED RDW RBC AUTO: 16.7 % (ref 11.9–15.9)
GLUCOSE SERPL-MCNC: 100 MG/DL (ref 74–106)
HCT VFR BLD CALC: 41.2 % (ref 35.4–49)
HGB BLD-MCNC: 13.6 GM/DL (ref 11.7–16.9)
MCH RBC QN AUTO: 30.2 PG (ref 25.7–33.7)
MCHC RBC AUTO-ENTMCNC: 33 G/DL (ref 32–35.9)
MCV RBC: 91.7 FL (ref 80–96)
PLATELET # BLD AUTO: 376 10^3/UL (ref 134–434)
PMV BLD: 8.1 FL (ref 7.5–11.1)
POTASSIUM SERPLBLD-SCNC: 4 MMOL/L (ref 3.5–5.1)
PROT SERPL-MCNC: 7.6 G/DL (ref 6.4–8.2)
RBC # BLD AUTO: 4.49 M/MM3 (ref 4–5.6)
SODIUM SERPL-SCNC: 138 MMOL/L (ref 136–145)
WBC # BLD AUTO: 8.3 K/MM3 (ref 4–10)

## 2023-06-22 PROCEDURE — HZ2ZZZZ DETOXIFICATION SERVICES FOR SUBSTANCE ABUSE TREATMENT: ICD-10-PCS | Performed by: SURGERY

## 2023-06-22 RX ADMIN — IBUPROFEN PRN MG: 600 TABLET, FILM COATED ORAL at 10:30

## 2023-06-22 RX ADMIN — LACOSAMIDE SCH MG: 50 TABLET, FILM COATED ORAL at 10:27

## 2023-06-22 RX ADMIN — LEVETIRACETAM SCH MG: 500 TABLET, FILM COATED ORAL at 13:00

## 2023-06-22 RX ADMIN — LEVETIRACETAM SCH MG: 500 TABLET, FILM COATED ORAL at 05:03

## 2023-06-22 RX ADMIN — LEVETIRACETAM SCH MG: 500 TABLET, FILM COATED ORAL at 23:12

## 2023-06-22 RX ADMIN — LACOSAMIDE SCH MG: 50 TABLET, FILM COATED ORAL at 23:11

## 2023-06-22 RX ADMIN — METHOCARBAMOL PRN MG: 500 TABLET ORAL at 10:30

## 2023-06-22 RX ADMIN — LEVETIRACETAM SCH: 500 TABLET, FILM COATED ORAL at 05:02

## 2023-06-22 RX ADMIN — BACITRACIN ZINC SCH GM: 500 OINTMENT TOPICAL at 14:52

## 2023-06-22 RX ADMIN — Medication SCH MG: at 23:11

## 2023-06-22 RX ADMIN — Medication SCH TAB: at 10:27

## 2023-06-23 RX ADMIN — Medication SCH MG: at 22:23

## 2023-06-23 RX ADMIN — LEVETIRACETAM SCH MG: 500 TABLET, FILM COATED ORAL at 22:23

## 2023-06-23 RX ADMIN — BACITRACIN ZINC SCH GM: 500 OINTMENT TOPICAL at 10:12

## 2023-06-23 RX ADMIN — LEVETIRACETAM SCH MG: 500 TABLET, FILM COATED ORAL at 13:26

## 2023-06-23 RX ADMIN — ACETAMINOPHEN PRN MG: 325 TABLET ORAL at 22:25

## 2023-06-23 RX ADMIN — BACITRACIN ZINC SCH GM: 500 OINTMENT TOPICAL at 12:33

## 2023-06-23 RX ADMIN — IBUPROFEN PRN MG: 600 TABLET, FILM COATED ORAL at 17:30

## 2023-06-23 RX ADMIN — Medication SCH TAB: at 10:12

## 2023-06-23 RX ADMIN — LEVETIRACETAM SCH MG: 500 TABLET, FILM COATED ORAL at 05:39

## 2023-06-23 RX ADMIN — LACOSAMIDE SCH MG: 50 TABLET, FILM COATED ORAL at 22:23

## 2023-06-23 RX ADMIN — LACOSAMIDE SCH MG: 50 TABLET, FILM COATED ORAL at 10:12

## 2023-06-24 RX ADMIN — IBUPROFEN PRN MG: 600 TABLET, FILM COATED ORAL at 05:44

## 2023-06-24 RX ADMIN — Medication SCH MG: at 22:17

## 2023-06-24 RX ADMIN — ACETAMINOPHEN PRN MG: 325 TABLET ORAL at 22:18

## 2023-06-24 RX ADMIN — LACOSAMIDE SCH MG: 50 TABLET, FILM COATED ORAL at 22:17

## 2023-06-24 RX ADMIN — Medication SCH TAB: at 10:23

## 2023-06-24 RX ADMIN — LACOSAMIDE SCH MG: 50 TABLET, FILM COATED ORAL at 10:24

## 2023-06-24 RX ADMIN — METHOCARBAMOL PRN MG: 500 TABLET ORAL at 18:03

## 2023-06-24 RX ADMIN — BACITRACIN ZINC SCH GM: 500 OINTMENT TOPICAL at 10:23

## 2023-06-24 RX ADMIN — BACITRACIN ZINC SCH GM: 500 OINTMENT TOPICAL at 10:24

## 2023-06-24 RX ADMIN — Medication SCH MG: at 22:16

## 2023-06-24 RX ADMIN — LEVETIRACETAM SCH MG: 500 TABLET, FILM COATED ORAL at 14:16

## 2023-06-24 RX ADMIN — LEVETIRACETAM SCH MG: 500 TABLET, FILM COATED ORAL at 05:41

## 2023-06-24 RX ADMIN — LEVETIRACETAM SCH MG: 500 TABLET, FILM COATED ORAL at 22:16

## 2023-06-24 RX ADMIN — IBUPROFEN PRN MG: 600 TABLET, FILM COATED ORAL at 18:03

## 2023-06-25 RX ADMIN — LACOSAMIDE SCH MG: 50 TABLET, FILM COATED ORAL at 22:25

## 2023-06-25 RX ADMIN — BACITRACIN ZINC SCH GM: 500 OINTMENT TOPICAL at 10:11

## 2023-06-25 RX ADMIN — Medication SCH MG: at 22:25

## 2023-06-25 RX ADMIN — BACITRACIN ZINC SCH GM: 500 OINTMENT TOPICAL at 10:10

## 2023-06-25 RX ADMIN — LACOSAMIDE SCH MG: 50 TABLET, FILM COATED ORAL at 10:10

## 2023-06-25 RX ADMIN — Medication SCH TAB: at 10:10

## 2023-06-25 RX ADMIN — LEVETIRACETAM SCH MG: 500 TABLET, FILM COATED ORAL at 13:59

## 2023-06-25 RX ADMIN — IBUPROFEN PRN MG: 600 TABLET, FILM COATED ORAL at 17:37

## 2023-06-25 RX ADMIN — Medication SCH MG: at 22:26

## 2023-06-25 RX ADMIN — METHOCARBAMOL PRN MG: 500 TABLET ORAL at 22:25

## 2023-06-25 RX ADMIN — LEVETIRACETAM SCH MG: 500 TABLET, FILM COATED ORAL at 22:25

## 2023-06-25 RX ADMIN — LEVETIRACETAM SCH MG: 500 TABLET, FILM COATED ORAL at 05:39

## 2023-06-26 VITALS
SYSTOLIC BLOOD PRESSURE: 123 MMHG | HEART RATE: 98 BPM | DIASTOLIC BLOOD PRESSURE: 82 MMHG | TEMPERATURE: 96.8 F | RESPIRATION RATE: 18 BRPM

## 2023-06-26 RX ADMIN — BACITRACIN ZINC SCH GM: 500 OINTMENT TOPICAL at 09:36

## 2023-06-26 RX ADMIN — LACOSAMIDE SCH MG: 50 TABLET, FILM COATED ORAL at 09:36

## 2023-06-26 RX ADMIN — Medication SCH TAB: at 09:38

## 2023-06-26 RX ADMIN — LEVETIRACETAM SCH MG: 500 TABLET, FILM COATED ORAL at 05:44

## 2023-07-12 ENCOUNTER — HOSPITAL ENCOUNTER (INPATIENT)
Dept: HOSPITAL 74 - YASAS | Age: 48
LOS: 5 days | Discharge: HOME | End: 2023-07-17
Attending: SURGERY | Admitting: ALLERGY & IMMUNOLOGY
Payer: COMMERCIAL

## 2023-07-12 VITALS — BODY MASS INDEX: 21.9 KG/M2

## 2023-07-12 DIAGNOSIS — F10.282: ICD-10-CM

## 2023-07-12 DIAGNOSIS — Z87.820: ICD-10-CM

## 2023-07-12 DIAGNOSIS — R73.9: ICD-10-CM

## 2023-07-12 DIAGNOSIS — G89.29: ICD-10-CM

## 2023-07-12 DIAGNOSIS — Z56.0: ICD-10-CM

## 2023-07-12 DIAGNOSIS — Z99.89: ICD-10-CM

## 2023-07-12 DIAGNOSIS — M54.50: ICD-10-CM

## 2023-07-12 DIAGNOSIS — J45.20: ICD-10-CM

## 2023-07-12 DIAGNOSIS — Z59.00: ICD-10-CM

## 2023-07-12 DIAGNOSIS — F10.230: Primary | ICD-10-CM

## 2023-07-12 DIAGNOSIS — F17.213: ICD-10-CM

## 2023-07-12 DIAGNOSIS — G40.909: ICD-10-CM

## 2023-07-12 DIAGNOSIS — F10.24: ICD-10-CM

## 2023-07-12 DIAGNOSIS — G47.00: ICD-10-CM

## 2023-07-12 PROCEDURE — HZ2ZZZZ DETOXIFICATION SERVICES FOR SUBSTANCE ABUSE TREATMENT: ICD-10-PCS | Performed by: SURGERY

## 2023-07-12 RX ADMIN — IBUPROFEN PRN MG: 400 TABLET, FILM COATED ORAL at 16:11

## 2023-07-12 RX ADMIN — Medication SCH TAB: at 16:08

## 2023-07-12 RX ADMIN — Medication SCH MG: at 22:41

## 2023-07-12 SDOH — ECONOMIC STABILITY - HOUSING INSECURITY: HOMELESSNESS UNSPECIFIED: Z59.00

## 2023-07-12 SDOH — ECONOMIC STABILITY - INCOME SECURITY: UNEMPLOYMENT, UNSPECIFIED: Z56.0

## 2023-07-13 RX ADMIN — METHOCARBAMOL PRN MG: 500 TABLET ORAL at 10:33

## 2023-07-13 RX ADMIN — NICOTINE SCH: 14 PATCH, EXTENDED RELEASE TRANSDERMAL at 10:36

## 2023-07-13 RX ADMIN — Medication SCH TAB: at 10:33

## 2023-07-13 RX ADMIN — Medication SCH: at 23:28

## 2023-07-13 RX ADMIN — ACETAMINOPHEN PRN MG: 325 TABLET ORAL at 17:57

## 2023-07-14 RX ADMIN — METHOCARBAMOL PRN MG: 500 TABLET ORAL at 22:35

## 2023-07-14 RX ADMIN — Medication SCH MG: at 22:35

## 2023-07-14 RX ADMIN — NICOTINE SCH: 14 PATCH, EXTENDED RELEASE TRANSDERMAL at 11:23

## 2023-07-14 RX ADMIN — Medication SCH MG: at 22:34

## 2023-07-14 RX ADMIN — Medication SCH: at 11:23

## 2023-07-15 RX ADMIN — NICOTINE SCH: 14 PATCH, EXTENDED RELEASE TRANSDERMAL at 10:30

## 2023-07-15 RX ADMIN — Medication SCH TAB: at 10:28

## 2023-07-15 RX ADMIN — Medication SCH MG: at 22:06

## 2023-07-15 RX ADMIN — METHOCARBAMOL PRN MG: 500 TABLET ORAL at 10:28

## 2023-07-16 RX ADMIN — NICOTINE SCH MG: 14 PATCH, EXTENDED RELEASE TRANSDERMAL at 10:16

## 2023-07-16 RX ADMIN — Medication SCH MG: at 21:31

## 2023-07-16 RX ADMIN — ACETAMINOPHEN PRN MG: 325 TABLET ORAL at 05:21

## 2023-07-16 RX ADMIN — IBUPROFEN PRN MG: 400 TABLET, FILM COATED ORAL at 21:31

## 2023-07-16 RX ADMIN — Medication SCH TAB: at 10:16

## 2023-07-17 VITALS
HEART RATE: 79 BPM | TEMPERATURE: 98 F | DIASTOLIC BLOOD PRESSURE: 83 MMHG | RESPIRATION RATE: 16 BRPM | SYSTOLIC BLOOD PRESSURE: 122 MMHG

## 2023-10-11 ENCOUNTER — HOSPITAL ENCOUNTER (INPATIENT)
Dept: HOSPITAL 74 - YASAS | Age: 48
LOS: 3 days | Discharge: ANOTHER HOSPITAL | End: 2023-10-14
Attending: SURGERY | Admitting: ALLERGY & IMMUNOLOGY
Payer: COMMERCIAL

## 2023-10-11 VITALS — BODY MASS INDEX: 22.5 KG/M2

## 2023-10-11 DIAGNOSIS — Z99.11: ICD-10-CM

## 2023-10-11 DIAGNOSIS — M54.50: ICD-10-CM

## 2023-10-11 DIAGNOSIS — F32.A: ICD-10-CM

## 2023-10-11 DIAGNOSIS — F41.9: ICD-10-CM

## 2023-10-11 DIAGNOSIS — Z99.89: ICD-10-CM

## 2023-10-11 DIAGNOSIS — Z87.820: ICD-10-CM

## 2023-10-11 DIAGNOSIS — F19.24: ICD-10-CM

## 2023-10-11 DIAGNOSIS — R26.89: ICD-10-CM

## 2023-10-11 DIAGNOSIS — F10.230: Primary | ICD-10-CM

## 2023-10-11 DIAGNOSIS — F17.210: ICD-10-CM

## 2023-10-11 DIAGNOSIS — G40.909: ICD-10-CM

## 2023-10-11 PROCEDURE — HZ2ZZZZ DETOXIFICATION SERVICES FOR SUBSTANCE ABUSE TREATMENT: ICD-10-PCS | Performed by: SURGERY

## 2023-10-11 RX ADMIN — LEVETIRACETAM SCH MG: 500 TABLET, FILM COATED ORAL at 23:04

## 2023-10-11 RX ADMIN — Medication SCH MG: at 23:05

## 2023-10-11 RX ADMIN — Medication SCH MG: at 23:04

## 2023-10-12 RX ADMIN — LEVETIRACETAM SCH MG: 500 TABLET, FILM COATED ORAL at 22:25

## 2023-10-12 RX ADMIN — Medication SCH MG: at 22:25

## 2023-10-12 RX ADMIN — Medication SCH TAB: at 10:38

## 2023-10-12 RX ADMIN — ACETAMINOPHEN PRN MG: 325 TABLET ORAL at 10:41

## 2023-10-12 RX ADMIN — NICOTINE SCH: 7 PATCH TRANSDERMAL at 10:39

## 2023-10-12 RX ADMIN — LEVETIRACETAM SCH MG: 500 TABLET, FILM COATED ORAL at 10:38

## 2023-10-13 RX ADMIN — NICOTINE SCH MG: 7 PATCH TRANSDERMAL at 10:48

## 2023-10-13 RX ADMIN — Medication SCH TAB: at 10:47

## 2023-10-13 RX ADMIN — LEVETIRACETAM SCH MG: 500 TABLET, FILM COATED ORAL at 10:47

## 2023-10-14 VITALS
HEART RATE: 61 BPM | RESPIRATION RATE: 18 BRPM | DIASTOLIC BLOOD PRESSURE: 88 MMHG | SYSTOLIC BLOOD PRESSURE: 131 MMHG | TEMPERATURE: 98 F

## 2023-10-14 RX ADMIN — Medication SCH MG: at 00:03

## 2023-10-14 RX ADMIN — ACETAMINOPHEN PRN MG: 325 TABLET ORAL at 00:08

## 2023-10-14 RX ADMIN — Medication SCH: at 23:54

## 2023-10-14 RX ADMIN — LEVETIRACETAM SCH: 500 TABLET, FILM COATED ORAL at 23:53

## 2023-10-14 RX ADMIN — Medication SCH TAB: at 10:44

## 2023-10-14 RX ADMIN — LEVETIRACETAM SCH MG: 500 TABLET, FILM COATED ORAL at 00:03

## 2023-10-14 RX ADMIN — LEVETIRACETAM SCH MG: 500 TABLET, FILM COATED ORAL at 10:44

## 2023-10-14 RX ADMIN — NICOTINE SCH MG: 7 PATCH TRANSDERMAL at 10:45

## 2023-10-14 RX ADMIN — NICOTINE SCH: 7 PATCH TRANSDERMAL at 11:00

## 2023-11-22 NOTE — ED ADULT NURSE NOTE - IN THE PAST 12 MONTHS HAVE YOU USED DRUGS OTHER THAN THOSE REQUIRED FOR MEDICAL REASON?
MAGAN LARSEN [address] [place] [id number] 11/12/1929 9/12/2018 TRAMADOL HCL/ACETAMIN 37.5MG-325MG 90/30 Medicare RIGHTSOURCE - [facility]/ [address] [facility] FIDENCIO COVINGTON MD        FAXED TO Y No Xray Chest 1 View- PORTABLE-Routine

## 2024-02-01 RX ORDER — LEVETIRACETAM 250 MG/1
0 TABLET, FILM COATED ORAL
Qty: 0 | Refills: 0 | DISCHARGE

## 2024-05-31 NOTE — PATIENT PROFILE ADULT. - FUNCTIONAL SCREEN CURRENT LEVEL: BATHING, MLM
Quality 111:Pneumonia Vaccination Status For Older Adults: Pneumococcal Vaccination not Administered or Previously Received, Reason not Otherwise Specified Quality 110: Preventive Care And Screening: Influenza Immunization: Influenza Immunization not Administered for Documented Reasons. Detail Level: Detailed (2) assistive person no

## 2024-07-31 NOTE — ED ADULT TRIAGE NOTE - CCCP TRG CHIEF CMPLNT
Detail Level: Detailed Quality 431: Preventive Care And Screening: Unhealthy Alcohol Use - Screening: Patient not identified as an unhealthy alcohol user when screened for unhealthy alcohol use using a systematic screening method Quality 226: Preventive Care And Screening: Tobacco Use: Screening And Cessation Intervention: Patient screened for tobacco use, is a smoker AND received Cessation Counseling within measurement period or in the six months prior to the measurement period Quality 47: Advance Care Plan: Advance Care Planning discussed and documented in the medical record; patient did not wish or was not able to name a surrogate decision maker or provide an advance care plan. altered mental status

## 2025-01-13 NOTE — ED PROVIDER NOTE - NS ED MD DISPO DISCHARGE
Medication: alendronate (FOSAMAX) 70 MG tablet  passed protocol.   Last office visit date: 11/05/20224  Next appointment scheduled?: No; Outreach performed, left message for patient to call back.    Number of refills given: 0     Home